# Patient Record
Sex: FEMALE | Race: WHITE | Employment: OTHER | URBAN - METROPOLITAN AREA
[De-identification: names, ages, dates, MRNs, and addresses within clinical notes are randomized per-mention and may not be internally consistent; named-entity substitution may affect disease eponyms.]

---

## 2019-04-26 ENCOUNTER — ANESTHESIA (OUTPATIENT)
Dept: ENDOSCOPY | Age: 69
End: 2019-04-26
Payer: MEDICARE

## 2019-04-26 ENCOUNTER — HOSPITAL ENCOUNTER (OUTPATIENT)
Age: 69
Setting detail: OUTPATIENT SURGERY
Discharge: HOME | End: 2019-04-26
Attending: INTERNAL MEDICINE | Admitting: INTERNAL MEDICINE
Payer: MEDICARE

## 2019-04-26 ENCOUNTER — ANESTHESIA EVENT (OUTPATIENT)
Dept: ENDOSCOPY | Age: 69
End: 2019-04-26
Payer: MEDICARE

## 2019-04-26 VITALS
SYSTOLIC BLOOD PRESSURE: 149 MMHG | RESPIRATION RATE: 22 BRPM | DIASTOLIC BLOOD PRESSURE: 80 MMHG | TEMPERATURE: 97.6 F | OXYGEN SATURATION: 98 % | WEIGHT: 130 LBS | HEART RATE: 57 BPM

## 2019-04-26 PROCEDURE — 77030003657 HC NDL SCLER BSC -B: Performed by: INTERNAL MEDICINE

## 2019-04-26 PROCEDURE — 77030027957 HC TBNG IRR ENDOGTR BUSS -B: Performed by: INTERNAL MEDICINE

## 2019-04-26 PROCEDURE — 76040000007: Performed by: INTERNAL MEDICINE

## 2019-04-26 PROCEDURE — 77030009426 HC FCPS BIOP ENDOSC BSC -B: Performed by: INTERNAL MEDICINE

## 2019-04-26 PROCEDURE — 76060000032 HC ANESTHESIA 0.5 TO 1 HR: Performed by: INTERNAL MEDICINE

## 2019-04-26 PROCEDURE — 74011250636 HC RX REV CODE- 250/636

## 2019-04-26 PROCEDURE — 88305 TISSUE EXAM BY PATHOLOGIST: CPT

## 2019-04-26 PROCEDURE — 77030013992 HC SNR POLYP ENDOSC BSC -B: Performed by: INTERNAL MEDICINE

## 2019-04-26 RX ORDER — FENTANYL CITRATE 50 UG/ML
100 INJECTION, SOLUTION INTRAMUSCULAR; INTRAVENOUS
Status: DISCONTINUED | OUTPATIENT
Start: 2019-04-26 | End: 2019-04-26 | Stop reason: HOSPADM

## 2019-04-26 RX ORDER — LISINOPRIL 10 MG/1
TABLET ORAL DAILY
COMMUNITY

## 2019-04-26 RX ORDER — EPINEPHRINE 0.1 MG/ML
1 INJECTION INTRACARDIAC; INTRAVENOUS
Status: DISCONTINUED | OUTPATIENT
Start: 2019-04-26 | End: 2019-04-26 | Stop reason: HOSPADM

## 2019-04-26 RX ORDER — DEXTROMETHORPHAN/PSEUDOEPHED 2.5-7.5/.8
1.2 DROPS ORAL
Status: DISCONTINUED | OUTPATIENT
Start: 2019-04-26 | End: 2019-04-26 | Stop reason: HOSPADM

## 2019-04-26 RX ORDER — SODIUM CHLORIDE 9 MG/ML
INJECTION, SOLUTION INTRAVENOUS
Status: DISCONTINUED | OUTPATIENT
Start: 2019-04-26 | End: 2019-04-26 | Stop reason: HOSPADM

## 2019-04-26 RX ORDER — SODIUM CHLORIDE 9 MG/ML
50 INJECTION, SOLUTION INTRAVENOUS CONTINUOUS
Status: DISCONTINUED | OUTPATIENT
Start: 2019-04-26 | End: 2019-04-26 | Stop reason: HOSPADM

## 2019-04-26 RX ORDER — PROPOFOL 10 MG/ML
INJECTION, EMULSION INTRAVENOUS AS NEEDED
Status: DISCONTINUED | OUTPATIENT
Start: 2019-04-26 | End: 2019-04-26 | Stop reason: HOSPADM

## 2019-04-26 RX ORDER — LIDOCAINE HYDROCHLORIDE 20 MG/ML
INJECTION, SOLUTION EPIDURAL; INFILTRATION; INTRACAUDAL; PERINEURAL AS NEEDED
Status: DISCONTINUED | OUTPATIENT
Start: 2019-04-26 | End: 2019-04-26 | Stop reason: HOSPADM

## 2019-04-26 RX ORDER — MIDAZOLAM HYDROCHLORIDE 1 MG/ML
.25-1 INJECTION, SOLUTION INTRAMUSCULAR; INTRAVENOUS
Status: DISCONTINUED | OUTPATIENT
Start: 2019-04-26 | End: 2019-04-26 | Stop reason: HOSPADM

## 2019-04-26 RX ORDER — ATROPINE SULFATE 0.1 MG/ML
0.5 INJECTION INTRAVENOUS
Status: DISCONTINUED | OUTPATIENT
Start: 2019-04-26 | End: 2019-04-26 | Stop reason: HOSPADM

## 2019-04-26 RX ORDER — ASPIRIN 325 MG
325 TABLET ORAL DAILY
COMMUNITY
End: 2019-08-06

## 2019-04-26 RX ORDER — NALOXONE HYDROCHLORIDE 0.4 MG/ML
0.4 INJECTION, SOLUTION INTRAMUSCULAR; INTRAVENOUS; SUBCUTANEOUS
Status: DISCONTINUED | OUTPATIENT
Start: 2019-04-26 | End: 2019-04-26 | Stop reason: HOSPADM

## 2019-04-26 RX ORDER — FLUMAZENIL 0.1 MG/ML
0.2 INJECTION INTRAVENOUS
Status: DISCONTINUED | OUTPATIENT
Start: 2019-04-26 | End: 2019-04-26 | Stop reason: HOSPADM

## 2019-04-26 RX ORDER — SODIUM CHLORIDE 0.9 % (FLUSH) 0.9 %
5-40 SYRINGE (ML) INJECTION EVERY 8 HOURS
Status: DISCONTINUED | OUTPATIENT
Start: 2019-04-26 | End: 2019-04-26 | Stop reason: HOSPADM

## 2019-04-26 RX ORDER — SODIUM CHLORIDE 0.9 % (FLUSH) 0.9 %
5-40 SYRINGE (ML) INJECTION AS NEEDED
Status: DISCONTINUED | OUTPATIENT
Start: 2019-04-26 | End: 2019-04-26 | Stop reason: HOSPADM

## 2019-04-26 RX ADMIN — PROPOFOL 25 MG: 10 INJECTION, EMULSION INTRAVENOUS at 16:27

## 2019-04-26 RX ADMIN — PROPOFOL 50 MG: 10 INJECTION, EMULSION INTRAVENOUS at 16:01

## 2019-04-26 RX ADMIN — PROPOFOL 50 MG: 10 INJECTION, EMULSION INTRAVENOUS at 16:14

## 2019-04-26 RX ADMIN — PROPOFOL 25 MG: 10 INJECTION, EMULSION INTRAVENOUS at 16:29

## 2019-04-26 RX ADMIN — PROPOFOL 50 MG: 10 INJECTION, EMULSION INTRAVENOUS at 16:07

## 2019-04-26 RX ADMIN — PROPOFOL 50 MG: 10 INJECTION, EMULSION INTRAVENOUS at 16:09

## 2019-04-26 RX ADMIN — PROPOFOL 25 MG: 10 INJECTION, EMULSION INTRAVENOUS at 16:18

## 2019-04-26 RX ADMIN — PROPOFOL 25 MG: 10 INJECTION, EMULSION INTRAVENOUS at 16:25

## 2019-04-26 RX ADMIN — PROPOFOL 25 MG: 10 INJECTION, EMULSION INTRAVENOUS at 16:16

## 2019-04-26 RX ADMIN — PROPOFOL 50 MG: 10 INJECTION, EMULSION INTRAVENOUS at 16:04

## 2019-04-26 RX ADMIN — SODIUM CHLORIDE: 9 INJECTION, SOLUTION INTRAVENOUS at 15:38

## 2019-04-26 RX ADMIN — PROPOFOL 25 MG: 10 INJECTION, EMULSION INTRAVENOUS at 16:43

## 2019-04-26 RX ADMIN — PROPOFOL 25 MG: 10 INJECTION, EMULSION INTRAVENOUS at 16:40

## 2019-04-26 RX ADMIN — PROPOFOL 25 MG: 10 INJECTION, EMULSION INTRAVENOUS at 16:20

## 2019-04-26 RX ADMIN — PROPOFOL 25 MG: 10 INJECTION, EMULSION INTRAVENOUS at 16:37

## 2019-04-26 RX ADMIN — PROPOFOL 25 MG: 10 INJECTION, EMULSION INTRAVENOUS at 16:31

## 2019-04-26 RX ADMIN — PROPOFOL 50 MG: 10 INJECTION, EMULSION INTRAVENOUS at 16:12

## 2019-04-26 RX ADMIN — PROPOFOL 50 MG: 10 INJECTION, EMULSION INTRAVENOUS at 15:59

## 2019-04-26 RX ADMIN — PROPOFOL 25 MG: 10 INJECTION, EMULSION INTRAVENOUS at 16:22

## 2019-04-26 RX ADMIN — LIDOCAINE HYDROCHLORIDE 50 MG: 20 INJECTION, SOLUTION EPIDURAL; INFILTRATION; INTRACAUDAL; PERINEURAL at 15:59

## 2019-04-26 RX ADMIN — SODIUM CHLORIDE: 9 INJECTION, SOLUTION INTRAVENOUS at 16:07

## 2019-04-26 RX ADMIN — PROPOFOL 25 MG: 10 INJECTION, EMULSION INTRAVENOUS at 16:33

## 2019-04-26 NOTE — DISCHARGE INSTRUCTIONS
[address]  [address] [address] [facility]                                  Amy Hwang  [id number]  1950    It was my pleasure seeing you for your procedure. You will also receive a summary report with the findings from this procedure and any further recommendations. If you had polyps removed or biopsies taken during your procedure, you will receive a separate letter from me within the next 2 weeks. If you don't receive this letter or if you have any questions, please call my office [phone]. Please take note of the post procedure instructions listed below. Jack Capellan,    Dr. Matt Alonso    These instructions give you information on caring for yourself after your procedure. Call your doctor if you have any problems or questions after your procedure. HOME CARE  · Walk if you have belly cramping or gas. Walking will help get rid of the air and reduce the bloated feeling in your belly (abdomen). · Your IV site (where you received drugs) may be tender to touch. Place warm towels on the site; keep your arm up on two pillows if you have any swelling or soreness in the area. · You may shower. ACTIVITY:  · Take frequent rest periods and move at a slower pace for the next 24 hours. .  · You may resume your regular activity tomorrow if you are feeling back to normal.  · Do not drive or ride a bicycle for at least 24 hours (because of the medicine (anesthesia) used during the test). · Do not sign any important legal documents or use or operate any machinery for 24 hours  · Do not take sleeping medicines/nerve drugs for 24 hours unless the doctor tells you. · You can return to work/school tomorrow unless otherwise instructed. NUTRITION:  · Drink plenty of fluids to keep your pee (urine) clear or pale yellow  · Begin with a light meal and progress to your normal diet.  Heavy or fried foods are harder to digest and may make you feel sick to your stomach (nauseated). · Once you are feeling back to normal, you may resume your normal diet as instructed by your doctor. · Avoid alcoholic beverages for 24 hours or as instructed. IF YOU HAD BIOPSIES TAKEN OR POLYPS REMOVED DURING THE PROCEDURE:  · For the next 7 days, avoid all non-steroidal antiinflammatory medications such as Ibuprofen, Motrin, Advil, Alleve, Vanessa-seltzer, Goody's powder, BC powder. · If you do not have an heart condition that requires you to take a daily aspirin, you should avoid taking aspirin for 7 days. · Eat a soft diet for 24 hours. · Monitor your stools for any blood or dark black, tar-like, stools as this may be a sign of bleeding and if you see any blood, notify your doctor immediately. GET HELP RIGHT AWAY AND SEEK IMMEDIATE MEDICAL CARE IF:  · You have more than a spotting of blood in your stool. · You pass clumps of tissue (blood clots) or fill the toilet with blood. · Your belly is painfully swollen or puffy (abdominal distention). · You throw up (vomit). · You have a fever. · You have redness, pain or swelling at the IV site that last greater than two days. · You have abdominal pain or discomfort that is severe or gets worse throughout the day. Post-procedure diagnosis:     DIVERTICULOSIS  COLON POLYPS    Rectum: normal  Sigmoid: severe diverticulosis, 15 mm pedunculated polyp 17 cm from the anal verge, removed with hot snare  Descending Colon: mild diverticulosis  Transverse Colon: normal  Ascending Colon: 15 mm flat polyp around hepatic flexure, lifted with 6 cc of eleview and removed with hot snare  Cecum: normal  Terminal Ileum: normal    Recommendations:   - Await pathology. You should receive a letter within 2 weeks. - Resume normal medications.  - Recommendations re: timing of repeat colonoscopy pending pathology.

## 2019-04-26 NOTE — H&P
[address]   [address] [facility] [address], [address]  [phone]                                History and Physical     NAME: Anastasiia Parker   :  1950   MRN:  [id number]     HPI:  The patient was seen and examined. Past Surgical History:   Procedure Laterality Date    HX GYN      HX OTHER SURGICAL       Past Medical History:   Diagnosis Date    Hypertension      Social History     Tobacco Use    Smoking status: Former Smoker    Smokeless tobacco: Never Used    Tobacco comment: 40 years ago in college   Substance Use Topics    Alcohol use: Yes     Comment: twice weekly    Drug use: Not on file     Allergies   Allergen Reactions    Latex Vertigo    Benadr [Diphenhydramine Hcl] Rash    Sulfa (Sulfonamide Antibiotics) Rash     History reviewed. No pertinent family history. No current facility-administered medications for this encounter. PHYSICAL EXAM:  General: WD, WN. Alert, cooperative, no acute distress    HEENT: NC, Atraumatic. PERRLA, EOMI. Anicteric sclerae. Lungs:  CTA Bilaterally. No Wheezing/Rhonchi/Rales. Heart:  Regular  rhythm,  No murmur, No Rubs, No Gallops  Abdomen: Soft, Non distended, Non tender.  +Bowel sounds, no HSM  Extremities: No c/c/e  Neurologic:  CN 2-12 gi, Alert and oriented X 3. No acute neurological distress   Psych:   Good insight. Not anxious nor agitated. The heart, lungs and mental status were satisfactory for the administration of MAC sedation and for the procedure.       Mallampati score: 2       Assessment:   · Diverticulitis, first colonoscopy    Plan:   · Endoscopic procedure :colonoscopy  · MAC sedation

## 2019-04-26 NOTE — PROGRESS NOTES
TRANSFER - IN REPORT:    Verbal report received from ARTIE(name) on Alissa Rash      Report consisted of patients Situation, Background, Assessment and   Recommendations(SBAR). Information from the following report(s) SBAR, Procedure Summary and MAR was reviewed with the receiving nurse. Opportunity for questions and clarification was provided. Assessment completed upon patients arrival to unit and care assumed.

## 2019-04-26 NOTE — ANESTHESIA PREPROCEDURE EVALUATION
Relevant Problems No relevant active problems Anesthetic History No history of anesthetic complications Review of Systems / Medical History Patient summary reviewed, nursing notes reviewed and pertinent labs reviewed Pulmonary Within defined limits Neuro/Psych Within defined limits Cardiovascular Within defined limits GI/Hepatic/Renal 
Within defined limits Endo/Other Within defined limits Other Findings Physical Exam 
 
Airway Mallampati: I 
TM Distance: > 6 cm Neck ROM: normal range of motion Mouth opening: Normal 
 
 Cardiovascular Regular rate and rhythm,  S1 and S2 normal,  no murmur, click, rub, or gallop Dental 
No notable dental hx Pulmonary Breath sounds clear to auscultation Abdominal 
GI exam deferred Other Findings Anesthetic Plan ASA: 2 Anesthesia type: MAC Anesthetic plan and risks discussed with: Patient

## 2019-04-26 NOTE — ANESTHESIA POSTPROCEDURE EVALUATION
Post-Anesthesia Evaluation and Assessment Patient: Luna Philip MRN: [id number]  SSN: [id number] YOB: 1950  Age: 80 y.o. Sex: female I have evaluated the patient and they are stable and ready for discharge from the PACU. Cardiovascular Function/Vital Signs Visit Vitals /88 Pulse 64 Resp 17 Wt 59 kg (130 lb) SpO2 97% Patient is status post MAC anesthesia for Procedure(s): 
COLONOSCOPY 
ENDOSCOPIC POLYPECTOMY INJECTION. Nausea/Vomiting: None Postoperative hydration reviewed and adequate. Pain: 
Pain Scale 1: Numeric (0 - 10) (04/26/19 1425) Pain Intensity 1: 0 (04/26/19 1425) Managed Neurological Status: At baseline Mental Status, Level of Consciousness: Alert and  oriented to person, place, and time Pulmonary Status:  
O2 Device: Nasal cannula (04/26/19 1645) Adequate oxygenation and airway patent Complications related to anesthesia: None Post-anesthesia assessment completed. No concerns Signed By: Elizabeth Tadeo MD   
 April 26, 2019 Procedure(s): 
COLONOSCOPY 
ENDOSCOPIC POLYPECTOMY INJECTION. MAC 
 
<BSHSIANPOST> Vitals Value Taken Time /88 4/26/2019  4:45 PM  
Temp Pulse 64 4/26/2019  4:45 PM  
Resp 17 4/26/2019  4:45 PM  
SpO2 97 % 4/26/2019  4:45 PM

## 2019-04-26 NOTE — PROCEDURES
[address] [facility]  [address] [address] [address], [address]  [phone]                              Colonoscopy Procedure Note      Indications:  Prior diverticulitis, first colonsocopy     :  Tamiko Morataya MD    Referring Provider: Skylar Nieves MD    Sedation:  MAC anesthesia    Procedure Details:  After informed consent was obtained with all risks and benefits of procedure explained and preoperative exam completed, the patient was taken to the endoscopy suite and placed in the left lateral decubitus position. Upon sequential sedation as per above, a digital rectal exam was performed per below. The Olympus videocolonoscope was inserted in the rectum and carefully advanced to the cecum, which was identified by the ileocecal valve and appendiceal orifice, terminal ileum. The quality of preparation was good. [place] Bowel Prep Score :3/3/3. The colonoscope was slowly withdrawn with careful evaluation between folds. Retroflexion in the rectum was performed. Findings:   Rectum: normal  Sigmoid: severe diverticulosis, 15 mm pedunculated polyp 17 cm from the anal verge, removed with hot snare  Descending Colon: mild diverticulosis  Transverse Colon: normal  Ascending Colon: 15 mm flat polyp around hepatic flexure, lifted with 6 cc of eleview and removed with hot snare  Cecum: normal  Terminal Ileum: normal    Interventions:  EMR    Specimen Removed:    ID Type Source Tests Collected by Time Destination   1 : AT 25 CM Preservative Sigmoid  Linda Waters MD 4/26/2019 1608 Pathology   2 : PATHOLOGY Preservative Colon, Ascending  Linda Waters MD 4/26/2019 1623 Pathology       Complications: None. EBL:  Minimal    Impression:    See Postoperative diagnosis above    Recommendations:   - Await pathology. You should receive a letter within 2 weeks. - Resume normal medications.  - Recommendations re: repeat colonoscopy timing pending pathology.      Discharge Disposition:  Home in the company of a  when able to ambulate.     Ly Renee MD  4/26/2019  4:47 PM

## 2019-05-16 ENCOUNTER — HOSPITAL ENCOUNTER (OUTPATIENT)
Dept: CT IMAGING | Age: 69
Discharge: HOME | End: 2019-05-16
Attending: INTERNAL MEDICINE
Payer: MEDICARE

## 2019-05-16 DIAGNOSIS — K57.92 DIVERTICULITIS: ICD-10-CM

## 2019-05-16 DIAGNOSIS — K76.89 LIVER CYST: ICD-10-CM

## 2019-05-16 DIAGNOSIS — C18.7 ADENOCARCINOMA OF SIGMOID COLON (HCC): ICD-10-CM

## 2019-05-16 PROCEDURE — 74011000258 HC RX REV CODE- 258: Performed by: RADIOLOGY

## 2019-05-16 PROCEDURE — 74011636320 HC RX REV CODE- 636/320: Performed by: RADIOLOGY

## 2019-05-16 PROCEDURE — 82565 ASSAY OF CREATININE: CPT

## 2019-05-16 PROCEDURE — 74177 CT ABD & PELVIS W/CONTRAST: CPT

## 2019-05-16 RX ORDER — SODIUM CHLORIDE 0.9 % (FLUSH) 0.9 %
10 SYRINGE (ML) INJECTION
Status: COMPLETED | OUTPATIENT
Start: 2019-05-16 | End: 2019-05-16

## 2019-05-16 RX ADMIN — IOPAMIDOL 100 ML: 755 INJECTION, SOLUTION INTRAVENOUS at 10:58

## 2019-05-16 RX ADMIN — Medication 10 ML: at 10:58

## 2019-05-16 RX ADMIN — IOHEXOL 50 ML: 240 INJECTION, SOLUTION INTRATHECAL; INTRAVASCULAR; INTRAVENOUS; ORAL at 10:58

## 2019-05-16 RX ADMIN — SODIUM CHLORIDE 100 ML: 900 INJECTION, SOLUTION INTRAVENOUS at 10:58

## 2019-05-17 LAB — CREAT BLD-MCNC: 0.5 MG/DL (ref 0.6–1.3)

## 2019-07-15 ENCOUNTER — HOSPITAL ENCOUNTER (OUTPATIENT)
Dept: CT IMAGING | Age: 69
Discharge: HOME | End: 2019-07-15
Attending: INTERNAL MEDICINE
Payer: MEDICARE

## 2019-07-15 DIAGNOSIS — C18.7 ADENOCARCINOMA OF SIGMOID COLON (HCC): ICD-10-CM

## 2019-07-15 DIAGNOSIS — K57.92 DIVERTICULITIS: ICD-10-CM

## 2019-07-15 PROCEDURE — 74177 CT ABD & PELVIS W/CONTRAST: CPT

## 2019-07-15 PROCEDURE — 74011000258 HC RX REV CODE- 258: Performed by: RADIOLOGY

## 2019-07-15 PROCEDURE — 74011636320 HC RX REV CODE- 636/320: Performed by: RADIOLOGY

## 2019-07-15 RX ORDER — SODIUM CHLORIDE 0.9 % (FLUSH) 0.9 %
10 SYRINGE (ML) INJECTION
Status: COMPLETED | OUTPATIENT
Start: 2019-07-15 | End: 2019-07-15

## 2019-07-15 RX ADMIN — Medication 10 ML: at 13:47

## 2019-07-15 RX ADMIN — SODIUM CHLORIDE 100 ML: 900 INJECTION, SOLUTION INTRAVENOUS at 13:47

## 2019-07-15 RX ADMIN — IOPAMIDOL 100 ML: 755 INJECTION, SOLUTION INTRAVENOUS at 13:47

## 2019-08-05 ENCOUNTER — ANESTHESIA (OUTPATIENT)
Dept: ENDOSCOPY | Age: 69
End: 2019-08-05
Payer: MEDICARE

## 2019-08-05 ENCOUNTER — HOSPITAL ENCOUNTER (OUTPATIENT)
Age: 69
Setting detail: OUTPATIENT SURGERY
Discharge: HOME | End: 2019-08-05
Attending: INTERNAL MEDICINE | Admitting: INTERNAL MEDICINE
Payer: MEDICARE

## 2019-08-05 ENCOUNTER — ANESTHESIA EVENT (OUTPATIENT)
Dept: ENDOSCOPY | Age: 69
End: 2019-08-05
Payer: MEDICARE

## 2019-08-05 VITALS
HEIGHT: 65 IN | SYSTOLIC BLOOD PRESSURE: 155 MMHG | RESPIRATION RATE: 16 BRPM | TEMPERATURE: 98.2 F | WEIGHT: 130 LBS | HEART RATE: 64 BPM | DIASTOLIC BLOOD PRESSURE: 97 MMHG | BODY MASS INDEX: 21.66 KG/M2 | OXYGEN SATURATION: 97 %

## 2019-08-05 PROCEDURE — 77030040684 HC NDL ENDOSC NEEDLEMASTR OCOA -B: Performed by: INTERNAL MEDICINE

## 2019-08-05 PROCEDURE — 88305 TISSUE EXAM BY PATHOLOGIST: CPT

## 2019-08-05 PROCEDURE — 74011250636 HC RX REV CODE- 250/636: Performed by: NURSE ANESTHETIST, CERTIFIED REGISTERED

## 2019-08-05 PROCEDURE — 76040000007: Performed by: INTERNAL MEDICINE

## 2019-08-05 PROCEDURE — 77030013992 HC SNR POLYP ENDOSC BSC -B: Performed by: INTERNAL MEDICINE

## 2019-08-05 PROCEDURE — 76060000032 HC ANESTHESIA 0.5 TO 1 HR: Performed by: INTERNAL MEDICINE

## 2019-08-05 PROCEDURE — 74011250636 HC RX REV CODE- 250/636: Performed by: INTERNAL MEDICINE

## 2019-08-05 RX ORDER — MIDAZOLAM HYDROCHLORIDE 1 MG/ML
.25-1 INJECTION, SOLUTION INTRAMUSCULAR; INTRAVENOUS
Status: DISCONTINUED | OUTPATIENT
Start: 2019-08-05 | End: 2019-08-05 | Stop reason: HOSPADM

## 2019-08-05 RX ORDER — EPINEPHRINE 0.1 MG/ML
1 INJECTION INTRACARDIAC; INTRAVENOUS
Status: DISCONTINUED | OUTPATIENT
Start: 2019-08-05 | End: 2019-08-05 | Stop reason: HOSPADM

## 2019-08-05 RX ORDER — SODIUM CHLORIDE 0.9 % (FLUSH) 0.9 %
5-40 SYRINGE (ML) INJECTION EVERY 8 HOURS
Status: DISCONTINUED | OUTPATIENT
Start: 2019-08-05 | End: 2019-08-05 | Stop reason: HOSPADM

## 2019-08-05 RX ORDER — SODIUM CHLORIDE 9 MG/ML
50 INJECTION, SOLUTION INTRAVENOUS CONTINUOUS
Status: DISCONTINUED | OUTPATIENT
Start: 2019-08-05 | End: 2019-08-05 | Stop reason: HOSPADM

## 2019-08-05 RX ORDER — SODIUM CHLORIDE 0.9 % (FLUSH) 0.9 %
5-40 SYRINGE (ML) INJECTION AS NEEDED
Status: DISCONTINUED | OUTPATIENT
Start: 2019-08-05 | End: 2019-08-05 | Stop reason: HOSPADM

## 2019-08-05 RX ORDER — ATROPINE SULFATE 0.1 MG/ML
0.5 INJECTION INTRAVENOUS
Status: DISCONTINUED | OUTPATIENT
Start: 2019-08-05 | End: 2019-08-05 | Stop reason: HOSPADM

## 2019-08-05 RX ORDER — NALOXONE HYDROCHLORIDE 0.4 MG/ML
0.4 INJECTION, SOLUTION INTRAMUSCULAR; INTRAVENOUS; SUBCUTANEOUS
Status: DISCONTINUED | OUTPATIENT
Start: 2019-08-05 | End: 2019-08-05 | Stop reason: HOSPADM

## 2019-08-05 RX ORDER — PROPOFOL 10 MG/ML
INJECTION, EMULSION INTRAVENOUS AS NEEDED
Status: DISCONTINUED | OUTPATIENT
Start: 2019-08-05 | End: 2019-08-05 | Stop reason: HOSPADM

## 2019-08-05 RX ORDER — FENTANYL CITRATE 50 UG/ML
100 INJECTION, SOLUTION INTRAMUSCULAR; INTRAVENOUS
Status: DISCONTINUED | OUTPATIENT
Start: 2019-08-05 | End: 2019-08-05 | Stop reason: HOSPADM

## 2019-08-05 RX ORDER — DEXTROMETHORPHAN/PSEUDOEPHED 2.5-7.5/.8
1.2 DROPS ORAL
Status: DISCONTINUED | OUTPATIENT
Start: 2019-08-05 | End: 2019-08-05 | Stop reason: HOSPADM

## 2019-08-05 RX ORDER — SODIUM CHLORIDE 9 MG/ML
INJECTION, SOLUTION INTRAVENOUS
Status: DISCONTINUED | OUTPATIENT
Start: 2019-08-05 | End: 2019-08-05 | Stop reason: HOSPADM

## 2019-08-05 RX ORDER — FLUMAZENIL 0.1 MG/ML
0.2 INJECTION INTRAVENOUS
Status: DISCONTINUED | OUTPATIENT
Start: 2019-08-05 | End: 2019-08-05 | Stop reason: HOSPADM

## 2019-08-05 RX ORDER — LIDOCAINE HYDROCHLORIDE 20 MG/ML
INJECTION, SOLUTION EPIDURAL; INFILTRATION; INTRACAUDAL; PERINEURAL AS NEEDED
Status: DISCONTINUED | OUTPATIENT
Start: 2019-08-05 | End: 2019-08-05 | Stop reason: HOSPADM

## 2019-08-05 RX ADMIN — LIDOCAINE HYDROCHLORIDE 40 MG: 20 INJECTION, SOLUTION EPIDURAL; INFILTRATION; INTRACAUDAL; PERINEURAL at 08:49

## 2019-08-05 RX ADMIN — PROPOFOL 50 MG: 10 INJECTION, EMULSION INTRAVENOUS at 08:50

## 2019-08-05 RX ADMIN — PROPOFOL 20 MG: 10 INJECTION, EMULSION INTRAVENOUS at 09:04

## 2019-08-05 RX ADMIN — PROPOFOL 30 MG: 10 INJECTION, EMULSION INTRAVENOUS at 08:56

## 2019-08-05 RX ADMIN — PROPOFOL 20 MG: 10 INJECTION, EMULSION INTRAVENOUS at 09:11

## 2019-08-05 RX ADMIN — PROPOFOL 50 MG: 10 INJECTION, EMULSION INTRAVENOUS at 08:49

## 2019-08-05 RX ADMIN — SODIUM CHLORIDE: 900 INJECTION, SOLUTION INTRAVENOUS at 08:41

## 2019-08-05 RX ADMIN — PROPOFOL 40 MG: 10 INJECTION, EMULSION INTRAVENOUS at 09:14

## 2019-08-05 RX ADMIN — PROPOFOL 20 MG: 10 INJECTION, EMULSION INTRAVENOUS at 09:08

## 2019-08-05 RX ADMIN — PROPOFOL 50 MG: 10 INJECTION, EMULSION INTRAVENOUS at 08:53

## 2019-08-05 RX ADMIN — PROPOFOL 50 MG: 10 INJECTION, EMULSION INTRAVENOUS at 09:21

## 2019-08-05 RX ADMIN — PROPOFOL 20 MG: 10 INJECTION, EMULSION INTRAVENOUS at 09:01

## 2019-08-05 NOTE — PROCEDURES
[address] [facility].  217 [facility] [address], [address]  [phone]                              Colonoscopy Procedure Note      Indications: For follow-up of hepatic flexure polypectomy and T1 sigmoid adenocarcinoma (involving head of pedunculated polyp with clear borders) April 2019    :  Nadine Peirre MD    Referring Provider: Celestino Enriquez MD    Sedation:  MAC anesthesia Propofol and MAC anesthesia    Procedure Details:  After informed consent was obtained with all risks and benefits of procedure explained and preoperative exam completed, the patient was taken to the endoscopy suite and placed in the left lateral decubitus position. Upon sequential sedation as per above, a digital rectal exam was performed per below. The Olympus videocolonoscope was inserted in the rectum and carefully advanced to the cecum, which was identified by the ileocecal valve and appendiceal orifice. The quality of preparation was good. [place] Bowel Prep Score : 3/3/3. The colonoscope was slowly withdrawn with careful evaluation between folds. Retroflexion in the rectum was performed. Findings:   Rectum: normal  Sigmoid: severe diverticulosis without inflammatory changes. Extensively examined this area without evidence of polyp or polypectomy site, empirically tattooed at 17 cm from the anal verge  Descending Colon: normal  Transverse Colon: one 2 mm sessile polyp, removed with cold snare  Ascending Colon: normal, no evidence of residual polyp  Cecum: normal    Interventions:  polypectomy    Specimen Removed:    ID Type Source Tests Collected by Time Destination   1 : transverse colon polyp Preservative Colon, Transverse  Cait Turner MD 8/5/2019 6106 Pathology       Complications: None. EBL:  Minimal    Impression:    See Postoperative diagnosis above    Recommendations:   - Await pathology. You should receive a letter within 2 weeks. - Resume normal medications.   - Plan for sigmoid resection in light of recurrent diverticulitis and prior T1 adenocarcinoma in head of pedunculated polyp.   - Recommend repeat colonoscopy in 1 year. Discharge Disposition:  Home in the company of a  when able to ambulate.     Maribel Smith MD  8/5/2019  9:27 AM

## 2019-08-05 NOTE — PERIOP NOTES

## 2019-08-05 NOTE — DISCHARGE INSTRUCTIONS
[address] [facility] [address]  217 [facility] [address] [facility]                                  Katarzyna De La Rosa  [id number]  1950    It was my pleasure seeing you for your procedure. You will also receive a summary report with the findings from this procedure and any further recommendations. If you had polyps removed or biopsies taken during your procedure, you will receive a separate letter from me within the next 2 weeks. If you don't receive this letter or if you have any questions, please call my office [phone]. Please take note of the post procedure instructions listed below. Dr. Sherine Hernandez    These instructions give you information on caring for yourself after your procedure. Call your doctor if you have any problems or questions after your procedure. HOME CARE  · Walk if you have belly cramping or gas. Walking will help get rid of the air and reduce the bloated feeling in your belly (abdomen). · Your IV site (where you received drugs) may be tender to touch. Place warm towels on the site; keep your arm up on two pillows if you have any swelling or soreness in the area. · You may shower. ACTIVITY:  · Take frequent rest periods and move at a slower pace for the next 24 hours. .  · You may resume your regular activity tomorrow if you are feeling back to normal.  · Do not drive or ride a bicycle for at least 24 hours (because of the medicine (anesthesia) used during the test). · Do not sign any important legal documents or use or operate any machinery for 24 hours  · Do not take sleeping medicines/nerve drugs for 24 hours unless the doctor tells you. · You can return to work/school tomorrow unless otherwise instructed. NUTRITION:  · Drink plenty of fluids to keep your pee (urine) clear or pale yellow  · Begin with a light meal and progress to your normal diet.  Heavy or fried foods are harder to digest and may make you feel sick to your stomach (nauseated). · Once you are feeling back to normal, you may resume your normal diet as instructed by your doctor. · Avoid alcoholic beverages for 24 hours or as instructed. IF YOU HAD BIOPSIES TAKEN OR POLYPS REMOVED DURING THE PROCEDURE:  · For the next 7 days, avoid all non-steroidal antiinflammatory medications such as Ibuprofen, Motrin, Advil, Alleve, Vanessa-seltzer, Goody's powder, BC powder. · If you do not have an heart condition that requires you to take a daily aspirin, you should avoid taking aspirin for 7 days. · Eat a soft diet for 24 hours. · Monitor your stools for any blood or dark black, tar-like, stools as this may be a sign of bleeding and if you see any blood, notify your doctor immediately. GET HELP RIGHT AWAY AND SEEK IMMEDIATE MEDICAL CARE IF:  · You have more than a spotting of blood in your stool. · You pass clumps of tissue (blood clots) or fill the toilet with blood. · Your belly is painfully swollen or puffy (abdominal distention). · You throw up (vomit). · You have a fever. · You have redness, pain or swelling at the IV site that last greater than two days. · You have abdominal pain or discomfort that is severe or gets worse throughout the day. Post-procedure diagnosis:   1. diverticulosis  2.transverse colon polyp    Findings:   Rectum: normal  Sigmoid: severe diverticulosis without inflammatory changes. Extensively examined this area without evidence of polyp or polypectomy site, empirically tattooed at 17 cm from the anal verge  Descending Colon: normal  Transverse Colon: one 2 mm sessile polyp, removed with cold snare  Ascending Colon: normal, no evidence of residual polyp  Cecum: normal    Interventions:  polypectomy    Recommendations:   - Await pathology. You should receive a letter within 2 weeks. - Resume normal medications.   - Plan for sigmoid resection in light of recurrent diverticulitis and prior T1 adenocarcinoma in head of pedunculated polyp.   - Recommend repeat colonoscopy in 1 year.

## 2019-08-05 NOTE — ANESTHESIA PREPROCEDURE EVALUATION
Relevant Problems   No relevant active problems       Anesthetic History   No history of anesthetic complications            Review of Systems / Medical History  Patient summary reviewed, nursing notes reviewed and pertinent labs reviewed    Pulmonary  Within defined limits                 Neuro/Psych   Within defined limits           Cardiovascular  Within defined limits  Hypertension                   GI/Hepatic/Renal  Within defined limits              Endo/Other  Within defined limits           Other Findings              Physical Exam    Airway  Mallampati: II  TM Distance: > 6 cm  Neck ROM: normal range of motion   Mouth opening: Normal     Cardiovascular  Regular rate and rhythm,  S1 and S2 normal,  no murmur, click, rub, or gallop             Dental  No notable dental hx       Pulmonary  Breath sounds clear to auscultation               Abdominal  GI exam deferred       Other Findings            Anesthetic Plan    ASA: 2  Anesthesia type: MAC            Anesthetic plan and risks discussed with: Patient

## 2019-08-05 NOTE — ANESTHESIA POSTPROCEDURE EVALUATION
Procedure(s):  COLONOSCOPY (LATEX ALLERGY)  INJECTION  ENDOSCOPIC POLYPECTOMY. MAC    <BSHSIANPOST>    Vitals Value Taken Time   /85 8/5/2019  9:47 AM   Temp 36.8 °C (98.2 °F) 8/5/2019  9:30 AM   Pulse 65 8/5/2019  9:49 AM   Resp 16 8/5/2019  9:49 AM   SpO2 97 % 8/5/2019  9:49 AM   Vitals shown include unvalidated device data.

## 2019-08-05 NOTE — ROUTINE PROCESS
Chai Screen  1950  [id number]    Situation:  Verbal report received from: [facility]  Procedure: Procedure(s):  COLONOSCOPY (LATEX ALLERGY)  INJECTION  ENDOSCOPIC POLYPECTOMY    Background:    Preoperative diagnosis: ADENOCARCINOMA OF SIGMOID COLON, DIVERTICULITIS  Postoperative diagnosis: 1. diverticulosis  2.transverse colon polyp    :  Dr. Jacklyn Lanier  Assistant(s): Endoscopy Technician-1: Ronda Levine  Endoscopy RN-1: Toña Greer    Specimens:   ID Type Source Tests Collected by Time Destination   1 : transverse colon polyp Preservative Colon, Transverse  Paula Grant MD 8/5/2019 3997 Pathology     H. Pylori  no    Assessment:  Intra-procedure medications     Anesthesia gave intra-procedure sedation and medications, see anesthesia flow sheet yes    Intravenous fluids: NS@ KVO     Vital signs stable     Abdominal assessment: round and soft     Recommendation:  Discharge patient per MD order.     Family or Friend   Permission to share finding with family or friend yes

## 2019-08-05 NOTE — H&P
[address] [facility] [address]  [address], [address]  [phone]                                History and Physical     NAME: Chai Garduno   :  1950   MRN:  [id number]     HPI:  The patient was seen and examined. Past Surgical History:   Procedure Laterality Date    COLONOSCOPY N/A 2019    COLONOSCOPY performed by Paula Grant MD at [facility] ENDOSCOPY    HX GYN      hysterectomy    HX OTHER SURGICAL       Past Medical History:   Diagnosis Date    Hypertension      Social History     Tobacco Use    Smoking status: Former Smoker    Smokeless tobacco: Never Used    Tobacco comment: 40 years ago in college   Substance Use Topics    Alcohol use: Yes     Comment: twice weekly    Drug use: Not on file     Allergies   Allergen Reactions    Latex Vertigo    Benadr [Diphenhydramine Hcl] Rash    Sulfa (Sulfonamide Antibiotics) Rash     Family History   Problem Relation Age of Onset    Alzheimer Mother      Current Facility-Administered Medications   Medication Dose Route Frequency    0.9% sodium chloride infusion  50 mL/hr IntraVENous CONTINUOUS    sodium chloride (NS) flush 5-40 mL  5-40 mL IntraVENous Q8H    sodium chloride (NS) flush 5-40 mL  5-40 mL IntraVENous PRN    midazolam (VERSED) injection 0.25-10 mg  0.25-10 mg IntraVENous Multiple    fentaNYL citrate (PF) injection 100 mcg  100 mcg IntraVENous MULTIPLE DOSE GIVEN    naloxone (NARCAN) injection 0.4 mg  0.4 mg IntraVENous Multiple    flumazenil (ROMAZICON) 0.1 mg/mL injection 0.2 mg  0.2 mg IntraVENous Multiple    simethicone (MYLICON) 62LT/9.2TS oral drops 80 mg  1.2 mL Oral Multiple    atropine injection 0.5 mg  0.5 mg IntraVENous ONCE PRN    EPINEPHrine (ADRENALIN) 0.1 mg/mL syringe 1 mg  1 mg Endoscopically ONCE PRN         PHYSICAL EXAM:  General: WD, WN. Alert, cooperative, no acute distress    HEENT: NC, Atraumatic. PERRLA, EOMI. Anicteric sclerae. Lungs:  CTA Bilaterally.  No Wheezing/Rhonchi/Rales. Heart:  Regular  rhythm,  No murmur, No Rubs, No Gallops  Abdomen: Soft, Non distended, Non tender.  +Bowel sounds, no HSM  Extremities: No c/c/e  Neurologic:  CN 2-12 gi, Alert and oriented X 3. No acute neurological distress   Psych:   Good insight. Not anxious nor agitated. The heart, lungs and mental status were satisfactory for the administration of MAC sedation and for the procedure.       Mallampati score: 2       Assessment:   · Follow up of recent polypectomy, planned surgery 8/15/19    Plan:   · Endoscopic procedure :colonoscopy  · MAC sedation

## 2019-08-06 ENCOUNTER — HOSPITAL ENCOUNTER (OUTPATIENT)
Dept: GENERAL RADIOLOGY | Age: 69
Discharge: HOME | End: 2019-08-06
Attending: COLON & RECTAL SURGERY
Payer: MEDICARE

## 2019-08-06 ENCOUNTER — HOSPITAL ENCOUNTER (OUTPATIENT)
Dept: PREADMISSION TESTING | Age: 69
Discharge: HOME | End: 2019-08-06
Payer: MEDICARE

## 2019-08-06 VITALS
BODY MASS INDEX: 21.83 KG/M2 | TEMPERATURE: 97.9 F | HEIGHT: 65 IN | SYSTOLIC BLOOD PRESSURE: 161 MMHG | WEIGHT: 131 LBS | DIASTOLIC BLOOD PRESSURE: 84 MMHG | HEART RATE: 60 BPM

## 2019-08-06 LAB
ALBUMIN SERPL-MCNC: 4.2 G/DL (ref 3.5–5)
ALBUMIN/GLOB SERPL: 1.4 {RATIO} (ref 1.1–2.2)
ALP SERPL-CCNC: 79 U/L (ref 45–117)
ALT SERPL-CCNC: 28 U/L (ref 12–78)
ANION GAP SERPL CALC-SCNC: 5 MMOL/L (ref 5–15)
APTT PPP: 27.2 SEC (ref 22.1–32)
AST SERPL-CCNC: 14 U/L (ref 15–37)
ATRIAL RATE: 54 BPM
BILIRUB SERPL-MCNC: 0.7 MG/DL (ref 0.2–1)
BUN SERPL-MCNC: 15 MG/DL (ref 6–20)
BUN/CREAT SERPL: 28 (ref 12–20)
CALCIUM SERPL-MCNC: 9.9 MG/DL (ref 8.5–10.1)
CALCULATED P AXIS, ECG09: 23 DEGREES
CALCULATED R AXIS, ECG10: -32 DEGREES
CALCULATED T AXIS, ECG11: -2 DEGREES
CEA SERPL-MCNC: 1.8 NG/ML
CHLORIDE SERPL-SCNC: 108 MMOL/L (ref 97–108)
CO2 SERPL-SCNC: 29 MMOL/L (ref 21–32)
CREAT SERPL-MCNC: 0.53 MG/DL (ref 0.55–1.02)
DIAGNOSIS, 93000: NORMAL
ERYTHROCYTE [DISTWIDTH] IN BLOOD BY AUTOMATED COUNT: 14.1 % (ref 11.5–14.5)
EST. AVERAGE GLUCOSE BLD GHB EST-MCNC: 97 MG/DL
GLOBULIN SER CALC-MCNC: 2.9 G/DL (ref 2–4)
GLUCOSE SERPL-MCNC: 93 MG/DL (ref 65–100)
HBA1C MFR BLD: 5 % (ref 4.2–6.3)
HCT VFR BLD AUTO: 42.4 % (ref 35–47)
HGB BLD-MCNC: 13.9 G/DL (ref 11.5–16)
INR PPP: 1 (ref 0.9–1.1)
MAGNESIUM SERPL-MCNC: 2.2 MG/DL (ref 1.6–2.4)
MCH RBC QN AUTO: 28.8 PG (ref 26–34)
MCHC RBC AUTO-ENTMCNC: 32.8 G/DL (ref 30–36.5)
MCV RBC AUTO: 88 FL (ref 80–99)
NRBC # BLD: 0 K/UL (ref 0–0.01)
NRBC BLD-RTO: 0 PER 100 WBC
P-R INTERVAL, ECG05: 158 MS
PHOSPHATE SERPL-MCNC: 2.8 MG/DL (ref 2.6–4.7)
PLATELET # BLD AUTO: 217 K/UL (ref 150–400)
PMV BLD AUTO: 11.1 FL (ref 8.9–12.9)
POTASSIUM SERPL-SCNC: 4.2 MMOL/L (ref 3.5–5.1)
PROT SERPL-MCNC: 7.1 G/DL (ref 6.4–8.2)
PROTHROMBIN TIME: 10.4 SEC (ref 9–11.1)
Q-T INTERVAL, ECG07: 460 MS
QRS DURATION, ECG06: 130 MS
QTC CALCULATION (BEZET), ECG08: 436 MS
RBC # BLD AUTO: 4.82 M/UL (ref 3.8–5.2)
SODIUM SERPL-SCNC: 142 MMOL/L (ref 136–145)
THERAPEUTIC RANGE,PTTT: NORMAL SECS (ref 58–77)
VENTRICULAR RATE, ECG03: 54 BPM
WBC # BLD AUTO: 3.8 K/UL (ref 3.6–11)

## 2019-08-06 PROCEDURE — 85610 PROTHROMBIN TIME: CPT

## 2019-08-06 PROCEDURE — 82378 CARCINOEMBRYONIC ANTIGEN: CPT

## 2019-08-06 PROCEDURE — 83735 ASSAY OF MAGNESIUM: CPT

## 2019-08-06 PROCEDURE — 80053 COMPREHEN METABOLIC PANEL: CPT

## 2019-08-06 PROCEDURE — 84100 ASSAY OF PHOSPHORUS: CPT

## 2019-08-06 PROCEDURE — 85730 THROMBOPLASTIN TIME PARTIAL: CPT

## 2019-08-06 PROCEDURE — 85027 COMPLETE CBC AUTOMATED: CPT

## 2019-08-06 PROCEDURE — 86900 BLOOD TYPING SEROLOGIC ABO: CPT

## 2019-08-06 PROCEDURE — 83036 HEMOGLOBIN GLYCOSYLATED A1C: CPT

## 2019-08-06 PROCEDURE — 93005 ELECTROCARDIOGRAM TRACING: CPT

## 2019-08-06 PROCEDURE — 71046 X-RAY EXAM CHEST 2 VIEWS: CPT

## 2019-08-07 PROBLEM — I45.10 RBBB: Status: ACTIVE | Noted: 2019-08-07

## 2019-08-07 LAB
ABO + RH BLD: NORMAL
BLOOD GROUP ANTIBODIES SERPL: NORMAL
SPECIMEN EXP DATE BLD: NORMAL

## 2019-08-15 ENCOUNTER — ANESTHESIA EVENT (OUTPATIENT)
Dept: SURGERY | Age: 69
DRG: 330 | End: 2019-08-15
Payer: MEDICARE

## 2019-08-15 ENCOUNTER — ANESTHESIA (OUTPATIENT)
Dept: SURGERY | Age: 69
DRG: 330 | End: 2019-08-15
Payer: MEDICARE

## 2019-08-15 ENCOUNTER — HOSPITAL ENCOUNTER (INPATIENT)
Age: 69
LOS: 4 days | Discharge: HOME | DRG: 330 | End: 2019-08-19
Attending: COLON & RECTAL SURGERY | Admitting: COLON & RECTAL SURGERY
Payer: MEDICARE

## 2019-08-15 DIAGNOSIS — G89.18 ACUTE POST-OPERATIVE PAIN: Primary | ICD-10-CM

## 2019-08-15 PROBLEM — C18.9 COLON CANCER (HCC): Status: ACTIVE | Noted: 2019-08-15

## 2019-08-15 PROBLEM — Z87.19 HISTORY OF DIVERTICULITIS OF COLON: Chronic | Status: ACTIVE | Noted: 2019-08-15

## 2019-08-15 PROBLEM — I10 HYPERTENSION: Chronic | Status: ACTIVE | Noted: 2019-08-15

## 2019-08-15 LAB
ANION GAP SERPL CALC-SCNC: 10 MMOL/L (ref 5–15)
BASOPHILS # BLD: 0 K/UL (ref 0–0.1)
BASOPHILS NFR BLD: 0 % (ref 0–1)
BUN SERPL-MCNC: 9 MG/DL (ref 6–20)
BUN/CREAT SERPL: 10 (ref 12–20)
CALCIUM SERPL-MCNC: 8.3 MG/DL (ref 8.5–10.1)
CHLORIDE SERPL-SCNC: 108 MMOL/L (ref 97–108)
CO2 SERPL-SCNC: 23 MMOL/L (ref 21–32)
CREAT SERPL-MCNC: 0.93 MG/DL (ref 0.55–1.02)
DIFFERENTIAL METHOD BLD: ABNORMAL
EOSINOPHIL # BLD: 0 K/UL (ref 0–0.4)
EOSINOPHIL NFR BLD: 0 % (ref 0–7)
ERYTHROCYTE [DISTWIDTH] IN BLOOD BY AUTOMATED COUNT: 14.1 % (ref 11.5–14.5)
GLUCOSE SERPL-MCNC: 155 MG/DL (ref 65–100)
HCT VFR BLD AUTO: 38.2 % (ref 35–47)
HGB BLD-MCNC: 12.3 G/DL (ref 11.5–16)
IMM GRANULOCYTES # BLD AUTO: 0 K/UL
IMM GRANULOCYTES NFR BLD AUTO: 0 %
LYMPHOCYTES # BLD: 0.6 K/UL (ref 0.8–3.5)
LYMPHOCYTES NFR BLD: 7 % (ref 12–49)
MAGNESIUM SERPL-MCNC: 2.4 MG/DL (ref 1.6–2.4)
MCH RBC QN AUTO: 28.4 PG (ref 26–34)
MCHC RBC AUTO-ENTMCNC: 32.2 G/DL (ref 30–36.5)
MCV RBC AUTO: 88.2 FL (ref 80–99)
MONOCYTES # BLD: 0.3 K/UL (ref 0–1)
MONOCYTES NFR BLD: 4 % (ref 5–13)
NEUTS BAND NFR BLD MANUAL: 7 % (ref 0–6)
NEUTS SEG # BLD: 7.5 K/UL (ref 1.8–8)
NEUTS SEG NFR BLD: 82 % (ref 32–75)
NRBC # BLD: 0 K/UL (ref 0–0.01)
NRBC BLD-RTO: 0 PER 100 WBC
PHOSPHATE SERPL-MCNC: 2.9 MG/DL (ref 2.6–4.7)
PLATELET # BLD AUTO: 209 K/UL (ref 150–400)
PMV BLD AUTO: 10.8 FL (ref 8.9–12.9)
POTASSIUM SERPL-SCNC: 3.6 MMOL/L (ref 3.5–5.1)
RBC # BLD AUTO: 4.33 M/UL (ref 3.8–5.2)
RBC MORPH BLD: ABNORMAL
SODIUM SERPL-SCNC: 141 MMOL/L (ref 136–145)
WBC # BLD AUTO: 8.4 K/UL (ref 3.6–11)

## 2019-08-15 PROCEDURE — 77030012893

## 2019-08-15 PROCEDURE — 77030008756 HC TU IRR SUC STRY -B: Performed by: COLON & RECTAL SURGERY

## 2019-08-15 PROCEDURE — 77030035045 HC TRCR ENDOSC VRSPRT BLDLSS COVD -B: Performed by: COLON & RECTAL SURGERY

## 2019-08-15 PROCEDURE — 77030010121 HC SHR COAG UPLR COVD -B: Performed by: COLON & RECTAL SURGERY

## 2019-08-15 PROCEDURE — 84100 ASSAY OF PHOSPHORUS: CPT

## 2019-08-15 PROCEDURE — 77030031139 HC SUT VCRL2 J&J -A: Performed by: COLON & RECTAL SURGERY

## 2019-08-15 PROCEDURE — 0DBP4ZZ EXCISION OF RECTUM, PERCUTANEOUS ENDOSCOPIC APPROACH: ICD-10-PCS | Performed by: COLON & RECTAL SURGERY

## 2019-08-15 PROCEDURE — 77030009527 HC GEL PRT SYS AMR -E: Performed by: COLON & RECTAL SURGERY

## 2019-08-15 PROCEDURE — 77030019702 HC WRP THER MENM -C: Performed by: COLON & RECTAL SURGERY

## 2019-08-15 PROCEDURE — 77030012406 HC DRN WND PENRS BARD -A: Performed by: COLON & RECTAL SURGERY

## 2019-08-15 PROCEDURE — C1769 GUIDE WIRE: HCPCS | Performed by: COLON & RECTAL SURGERY

## 2019-08-15 PROCEDURE — 74011000250 HC RX REV CODE- 250: Performed by: NURSE ANESTHETIST, CERTIFIED REGISTERED

## 2019-08-15 PROCEDURE — C1758 CATHETER, URETERAL: HCPCS | Performed by: COLON & RECTAL SURGERY

## 2019-08-15 PROCEDURE — 77030018846 HC SOL IRR STRL H20 ICUM -A: Performed by: COLON & RECTAL SURGERY

## 2019-08-15 PROCEDURE — 77030020747 HC TU INSUF ENDOSC TELE -A: Performed by: COLON & RECTAL SURGERY

## 2019-08-15 PROCEDURE — 77030018836 HC SOL IRR NACL ICUM -A: Performed by: COLON & RECTAL SURGERY

## 2019-08-15 PROCEDURE — 74011250636 HC RX REV CODE- 250/636: Performed by: NURSE ANESTHETIST, CERTIFIED REGISTERED

## 2019-08-15 PROCEDURE — 77030040361 HC SLV COMPR DVT MDII -B: Performed by: COLON & RECTAL SURGERY

## 2019-08-15 PROCEDURE — 76010000136 HC OR TIME 4.5 TO 5 HR: Performed by: COLON & RECTAL SURGERY

## 2019-08-15 PROCEDURE — 77030002933 HC SUT MCRYL J&J -A: Performed by: COLON & RECTAL SURGERY

## 2019-08-15 PROCEDURE — 77030010515 HC APPL ENDOCLP LIG J&J -B: Performed by: COLON & RECTAL SURGERY

## 2019-08-15 PROCEDURE — 0DTN4ZZ RESECTION OF SIGMOID COLON, PERCUTANEOUS ENDOSCOPIC APPROACH: ICD-10-PCS | Performed by: COLON & RECTAL SURGERY

## 2019-08-15 PROCEDURE — 77030002986 HC SUT PROL J&J -A: Performed by: COLON & RECTAL SURGERY

## 2019-08-15 PROCEDURE — 65270000029 HC RM PRIVATE

## 2019-08-15 PROCEDURE — 77030010545: Performed by: COLON & RECTAL SURGERY

## 2019-08-15 PROCEDURE — 77030018832 HC SOL IRR H20 ICUM -A: Performed by: COLON & RECTAL SURGERY

## 2019-08-15 PROCEDURE — 77030011640 HC PAD GRND REM COVD -A: Performed by: COLON & RECTAL SURGERY

## 2019-08-15 PROCEDURE — 77030035174 HC STPLR ENDOSC DST EEA COVD -D: Performed by: COLON & RECTAL SURGERY

## 2019-08-15 PROCEDURE — 77030025625 HC DEV TISS SEAL J&J -F: Performed by: COLON & RECTAL SURGERY

## 2019-08-15 PROCEDURE — 74011250637 HC RX REV CODE- 250/637: Performed by: COLON & RECTAL SURGERY

## 2019-08-15 PROCEDURE — 77030009965 HC RELD STPLR ENDOS COVD -D: Performed by: COLON & RECTAL SURGERY

## 2019-08-15 PROCEDURE — 74011250636 HC RX REV CODE- 250/636: Performed by: ANESTHESIOLOGY

## 2019-08-15 PROCEDURE — 77030034850: Performed by: COLON & RECTAL SURGERY

## 2019-08-15 PROCEDURE — 77030020782 HC GWN BAIR PAWS FLX 3M -B

## 2019-08-15 PROCEDURE — 76060000040 HC ANESTHESIA 4.5 TO 5 HR: Performed by: COLON & RECTAL SURGERY

## 2019-08-15 PROCEDURE — 77030005546 HC CATH URETH FOL 3W BARD -A: Performed by: COLON & RECTAL SURGERY

## 2019-08-15 PROCEDURE — 85025 COMPLETE CBC W/AUTO DIFF WBC: CPT

## 2019-08-15 PROCEDURE — 77030016151 HC PROTCTR LNS DFOG COVD -B: Performed by: COLON & RECTAL SURGERY

## 2019-08-15 PROCEDURE — 77030038157 HC DEV PWR CNTR DISP SIGNIA COVD -C: Performed by: COLON & RECTAL SURGERY

## 2019-08-15 PROCEDURE — 77030013567 HC DRN WND RESERV BARD -A: Performed by: COLON & RECTAL SURGERY

## 2019-08-15 PROCEDURE — 77030002966 HC SUT PDS J&J -A: Performed by: COLON & RECTAL SURGERY

## 2019-08-15 PROCEDURE — 77030035051: Performed by: COLON & RECTAL SURGERY

## 2019-08-15 PROCEDURE — 74011000258 HC RX REV CODE- 258: Performed by: COLON & RECTAL SURGERY

## 2019-08-15 PROCEDURE — 74011000250 HC RX REV CODE- 250: Performed by: COLON & RECTAL SURGERY

## 2019-08-15 PROCEDURE — 80048 BASIC METABOLIC PNL TOTAL CA: CPT

## 2019-08-15 PROCEDURE — 77030002916 HC SUT ETHLN J&J -A: Performed by: COLON & RECTAL SURGERY

## 2019-08-15 PROCEDURE — 74011250636 HC RX REV CODE- 250/636

## 2019-08-15 PROCEDURE — P9045 ALBUMIN (HUMAN), 5%, 250 ML: HCPCS | Performed by: NURSE ANESTHETIST, CERTIFIED REGISTERED

## 2019-08-15 PROCEDURE — 36415 COLL VENOUS BLD VENIPUNCTURE: CPT

## 2019-08-15 PROCEDURE — 74011250636 HC RX REV CODE- 250/636: Performed by: COLON & RECTAL SURGERY

## 2019-08-15 PROCEDURE — 77030037366 HC STPLR ENDO TRI-STPLR COVD -C: Performed by: COLON & RECTAL SURGERY

## 2019-08-15 PROCEDURE — 88305 TISSUE EXAM BY PATHOLOGIST: CPT

## 2019-08-15 PROCEDURE — 77030035048 HC TRCR ENDOSC OPTCL COVD -B: Performed by: COLON & RECTAL SURGERY

## 2019-08-15 PROCEDURE — 76210000016 HC OR PH I REC 1 TO 1.5 HR: Performed by: COLON & RECTAL SURGERY

## 2019-08-15 PROCEDURE — 77030015927 HC DEV TISS SEAL SURX -F: Performed by: COLON & RECTAL SURGERY

## 2019-08-15 PROCEDURE — 88309 TISSUE EXAM BY PATHOLOGIST: CPT

## 2019-08-15 PROCEDURE — 83735 ASSAY OF MAGNESIUM: CPT

## 2019-08-15 PROCEDURE — 77030019927 HC TBNG IRR CYSTO BAXT -A: Performed by: COLON & RECTAL SURGERY

## 2019-08-15 PROCEDURE — 77030002996 HC SUT SLK J&J -A: Performed by: COLON & RECTAL SURGERY

## 2019-08-15 RX ORDER — SODIUM CHLORIDE, SODIUM LACTATE, POTASSIUM CHLORIDE, CALCIUM CHLORIDE 600; 310; 30; 20 MG/100ML; MG/100ML; MG/100ML; MG/100ML
INJECTION, SOLUTION INTRAVENOUS
Status: DISCONTINUED | OUTPATIENT
Start: 2019-08-15 | End: 2019-08-15 | Stop reason: HOSPADM

## 2019-08-15 RX ORDER — SUCCINYLCHOLINE CHLORIDE 20 MG/ML
INJECTION INTRAMUSCULAR; INTRAVENOUS AS NEEDED
Status: DISCONTINUED | OUTPATIENT
Start: 2019-08-15 | End: 2019-08-15 | Stop reason: HOSPADM

## 2019-08-15 RX ORDER — KETOROLAC TROMETHAMINE 30 MG/ML
15 INJECTION, SOLUTION INTRAMUSCULAR; INTRAVENOUS EVERY 6 HOURS
Status: COMPLETED | OUTPATIENT
Start: 2019-08-15 | End: 2019-08-16

## 2019-08-15 RX ORDER — HYDROMORPHONE HYDROCHLORIDE 1 MG/ML
1 INJECTION, SOLUTION INTRAMUSCULAR; INTRAVENOUS; SUBCUTANEOUS
Status: DISCONTINUED | OUTPATIENT
Start: 2019-08-15 | End: 2019-08-18

## 2019-08-15 RX ORDER — NEOSTIGMINE METHYLSULFATE 1 MG/ML
INJECTION INTRAVENOUS AS NEEDED
Status: DISCONTINUED | OUTPATIENT
Start: 2019-08-15 | End: 2019-08-15 | Stop reason: HOSPADM

## 2019-08-15 RX ORDER — MIDAZOLAM HYDROCHLORIDE 1 MG/ML
INJECTION, SOLUTION INTRAMUSCULAR; INTRAVENOUS AS NEEDED
Status: DISCONTINUED | OUTPATIENT
Start: 2019-08-15 | End: 2019-08-15 | Stop reason: HOSPADM

## 2019-08-15 RX ORDER — ALBUMIN HUMAN 50 G/1000ML
SOLUTION INTRAVENOUS AS NEEDED
Status: DISCONTINUED | OUTPATIENT
Start: 2019-08-15 | End: 2019-08-15 | Stop reason: HOSPADM

## 2019-08-15 RX ORDER — MAGNESIUM SULFATE HEPTAHYDRATE 40 MG/ML
INJECTION, SOLUTION INTRAVENOUS AS NEEDED
Status: DISCONTINUED | OUTPATIENT
Start: 2019-08-15 | End: 2019-08-15 | Stop reason: HOSPADM

## 2019-08-15 RX ORDER — ALVIMOPAN 12 MG/1
12 CAPSULE ORAL 2 TIMES DAILY
Status: DISCONTINUED | OUTPATIENT
Start: 2019-08-16 | End: 2019-08-18

## 2019-08-15 RX ORDER — DEXMEDETOMIDINE HYDROCHLORIDE 100 UG/ML
INJECTION, SOLUTION INTRAVENOUS AS NEEDED
Status: DISCONTINUED | OUTPATIENT
Start: 2019-08-15 | End: 2019-08-15 | Stop reason: HOSPADM

## 2019-08-15 RX ORDER — LIDOCAINE HYDROCHLORIDE ANHYDROUS AND DEXTROSE MONOHYDRATE .8; 5 G/100ML; G/100ML
INJECTION, SOLUTION INTRAVENOUS
Status: DISCONTINUED | OUTPATIENT
Start: 2019-08-15 | End: 2019-08-15 | Stop reason: HOSPADM

## 2019-08-15 RX ORDER — ONDANSETRON 2 MG/ML
INJECTION INTRAMUSCULAR; INTRAVENOUS AS NEEDED
Status: DISCONTINUED | OUTPATIENT
Start: 2019-08-15 | End: 2019-08-15 | Stop reason: HOSPADM

## 2019-08-15 RX ORDER — HYDROMORPHONE HYDROCHLORIDE 1 MG/ML
0.2 INJECTION, SOLUTION INTRAMUSCULAR; INTRAVENOUS; SUBCUTANEOUS
Status: DISCONTINUED | OUTPATIENT
Start: 2019-08-15 | End: 2019-08-15 | Stop reason: HOSPADM

## 2019-08-15 RX ORDER — LISINOPRIL 10 MG/1
10 TABLET ORAL DAILY
Status: DISCONTINUED | OUTPATIENT
Start: 2019-08-17 | End: 2019-08-16

## 2019-08-15 RX ORDER — LEVOFLOXACIN 5 MG/ML
INJECTION, SOLUTION INTRAVENOUS AS NEEDED
Status: DISCONTINUED | OUTPATIENT
Start: 2019-08-15 | End: 2019-08-15

## 2019-08-15 RX ORDER — ENOXAPARIN SODIUM 100 MG/ML
40 INJECTION SUBCUTANEOUS EVERY 24 HOURS
Status: DISCONTINUED | OUTPATIENT
Start: 2019-08-16 | End: 2019-08-19 | Stop reason: HOSPADM

## 2019-08-15 RX ORDER — ACETAMINOPHEN 10 MG/ML
INJECTION, SOLUTION INTRAVENOUS AS NEEDED
Status: DISCONTINUED | OUTPATIENT
Start: 2019-08-15 | End: 2019-08-15 | Stop reason: HOSPADM

## 2019-08-15 RX ORDER — ONDANSETRON 4 MG/1
4 TABLET, ORALLY DISINTEGRATING ORAL
Status: DISCONTINUED | OUTPATIENT
Start: 2019-08-15 | End: 2019-08-19 | Stop reason: HOSPADM

## 2019-08-15 RX ORDER — ALVIMOPAN 12 MG/1
12 CAPSULE ORAL ONCE
Status: COMPLETED | OUTPATIENT
Start: 2019-08-15 | End: 2019-08-15

## 2019-08-15 RX ORDER — EPHEDRINE SULFATE/0.9% NACL/PF 50 MG/5 ML
SYRINGE (ML) INTRAVENOUS AS NEEDED
Status: DISCONTINUED | OUTPATIENT
Start: 2019-08-15 | End: 2019-08-15 | Stop reason: HOSPADM

## 2019-08-15 RX ORDER — SODIUM CHLORIDE 9 MG/ML
INJECTION, SOLUTION INTRAVENOUS
Status: DISCONTINUED | OUTPATIENT
Start: 2019-08-15 | End: 2019-08-15 | Stop reason: HOSPADM

## 2019-08-15 RX ORDER — SODIUM CHLORIDE 0.9 % (FLUSH) 0.9 %
5-40 SYRINGE (ML) INJECTION AS NEEDED
Status: DISCONTINUED | OUTPATIENT
Start: 2019-08-15 | End: 2019-08-15 | Stop reason: HOSPADM

## 2019-08-15 RX ORDER — KETAMINE HYDROCHLORIDE 10 MG/ML
INJECTION, SOLUTION INTRAMUSCULAR; INTRAVENOUS AS NEEDED
Status: DISCONTINUED | OUTPATIENT
Start: 2019-08-15 | End: 2019-08-15 | Stop reason: HOSPADM

## 2019-08-15 RX ORDER — MIDAZOLAM HYDROCHLORIDE 1 MG/ML
1 INJECTION, SOLUTION INTRAMUSCULAR; INTRAVENOUS AS NEEDED
Status: DISCONTINUED | OUTPATIENT
Start: 2019-08-15 | End: 2019-08-15 | Stop reason: HOSPADM

## 2019-08-15 RX ORDER — ONDANSETRON 2 MG/ML
4 INJECTION INTRAMUSCULAR; INTRAVENOUS AS NEEDED
Status: DISCONTINUED | OUTPATIENT
Start: 2019-08-15 | End: 2019-08-15 | Stop reason: HOSPADM

## 2019-08-15 RX ORDER — SODIUM CHLORIDE 0.9 % (FLUSH) 0.9 %
5-40 SYRINGE (ML) INJECTION EVERY 8 HOURS
Status: DISCONTINUED | OUTPATIENT
Start: 2019-08-15 | End: 2019-08-15 | Stop reason: HOSPADM

## 2019-08-15 RX ORDER — LIDOCAINE HYDROCHLORIDE 20 MG/ML
INJECTION, SOLUTION EPIDURAL; INFILTRATION; INTRACAUDAL; PERINEURAL AS NEEDED
Status: DISCONTINUED | OUTPATIENT
Start: 2019-08-15 | End: 2019-08-15 | Stop reason: HOSPADM

## 2019-08-15 RX ORDER — MORPHINE SULFATE 10 MG/ML
2 INJECTION, SOLUTION INTRAMUSCULAR; INTRAVENOUS
Status: DISCONTINUED | OUTPATIENT
Start: 2019-08-15 | End: 2019-08-15 | Stop reason: HOSPADM

## 2019-08-15 RX ORDER — FENTANYL CITRATE 50 UG/ML
50 INJECTION, SOLUTION INTRAMUSCULAR; INTRAVENOUS AS NEEDED
Status: DISCONTINUED | OUTPATIENT
Start: 2019-08-15 | End: 2019-08-15 | Stop reason: HOSPADM

## 2019-08-15 RX ORDER — OXYCODONE HYDROCHLORIDE 5 MG/1
5-10 TABLET ORAL
Status: DISCONTINUED | OUTPATIENT
Start: 2019-08-15 | End: 2019-08-16

## 2019-08-15 RX ORDER — ROCURONIUM BROMIDE 10 MG/ML
INJECTION, SOLUTION INTRAVENOUS AS NEEDED
Status: DISCONTINUED | OUTPATIENT
Start: 2019-08-15 | End: 2019-08-15 | Stop reason: HOSPADM

## 2019-08-15 RX ORDER — GLYCOPYRROLATE 0.2 MG/ML
INJECTION INTRAMUSCULAR; INTRAVENOUS AS NEEDED
Status: DISCONTINUED | OUTPATIENT
Start: 2019-08-15 | End: 2019-08-15 | Stop reason: HOSPADM

## 2019-08-15 RX ORDER — PROPOFOL 10 MG/ML
INJECTION, EMULSION INTRAVENOUS AS NEEDED
Status: DISCONTINUED | OUTPATIENT
Start: 2019-08-15 | End: 2019-08-15 | Stop reason: HOSPADM

## 2019-08-15 RX ORDER — SODIUM CHLORIDE, SODIUM LACTATE, POTASSIUM CHLORIDE, CALCIUM CHLORIDE 600; 310; 30; 20 MG/100ML; MG/100ML; MG/100ML; MG/100ML
125 INJECTION, SOLUTION INTRAVENOUS CONTINUOUS
Status: DISCONTINUED | OUTPATIENT
Start: 2019-08-15 | End: 2019-08-15 | Stop reason: HOSPADM

## 2019-08-15 RX ORDER — OXYCODONE AND ACETAMINOPHEN 5; 325 MG/1; MG/1
1 TABLET ORAL AS NEEDED
Status: DISCONTINUED | OUTPATIENT
Start: 2019-08-15 | End: 2019-08-15 | Stop reason: HOSPADM

## 2019-08-15 RX ORDER — SODIUM CHLORIDE, SODIUM LACTATE, POTASSIUM CHLORIDE, CALCIUM CHLORIDE 600; 310; 30; 20 MG/100ML; MG/100ML; MG/100ML; MG/100ML
75 INJECTION, SOLUTION INTRAVENOUS CONTINUOUS
Status: DISCONTINUED | OUTPATIENT
Start: 2019-08-15 | End: 2019-08-15 | Stop reason: HOSPADM

## 2019-08-15 RX ORDER — BUPIVACAINE HYDROCHLORIDE AND EPINEPHRINE 5; 5 MG/ML; UG/ML
INJECTION, SOLUTION EPIDURAL; INTRACAUDAL; PERINEURAL AS NEEDED
Status: DISCONTINUED | OUTPATIENT
Start: 2019-08-15 | End: 2019-08-15 | Stop reason: HOSPADM

## 2019-08-15 RX ORDER — MIDAZOLAM HYDROCHLORIDE 1 MG/ML
0.5 INJECTION, SOLUTION INTRAMUSCULAR; INTRAVENOUS
Status: DISCONTINUED | OUTPATIENT
Start: 2019-08-15 | End: 2019-08-15 | Stop reason: HOSPADM

## 2019-08-15 RX ORDER — FENTANYL CITRATE 50 UG/ML
INJECTION, SOLUTION INTRAMUSCULAR; INTRAVENOUS AS NEEDED
Status: DISCONTINUED | OUTPATIENT
Start: 2019-08-15 | End: 2019-08-15 | Stop reason: HOSPADM

## 2019-08-15 RX ORDER — GABAPENTIN 300 MG/1
600 CAPSULE ORAL ONCE
Status: COMPLETED | OUTPATIENT
Start: 2019-08-15 | End: 2019-08-15

## 2019-08-15 RX ORDER — SODIUM CHLORIDE, SODIUM LACTATE, POTASSIUM CHLORIDE, CALCIUM CHLORIDE 600; 310; 30; 20 MG/100ML; MG/100ML; MG/100ML; MG/100ML
75 INJECTION, SOLUTION INTRAVENOUS CONTINUOUS
Status: DISPENSED | OUTPATIENT
Start: 2019-08-15 | End: 2019-08-16

## 2019-08-15 RX ORDER — LIDOCAINE HYDROCHLORIDE ANHYDROUS AND DEXTROSE MONOHYDRATE .8; 5 G/100ML; G/100ML
1 INJECTION, SOLUTION INTRAVENOUS CONTINUOUS
Status: DISPENSED | OUTPATIENT
Start: 2019-08-15 | End: 2019-08-17

## 2019-08-15 RX ORDER — NALOXONE HYDROCHLORIDE 0.4 MG/ML
0.4 INJECTION, SOLUTION INTRAMUSCULAR; INTRAVENOUS; SUBCUTANEOUS AS NEEDED
Status: DISCONTINUED | OUTPATIENT
Start: 2019-08-15 | End: 2019-08-19 | Stop reason: HOSPADM

## 2019-08-15 RX ORDER — DIPHENHYDRAMINE HYDROCHLORIDE 50 MG/ML
12.5 INJECTION, SOLUTION INTRAMUSCULAR; INTRAVENOUS AS NEEDED
Status: DISCONTINUED | OUTPATIENT
Start: 2019-08-15 | End: 2019-08-15 | Stop reason: HOSPADM

## 2019-08-15 RX ORDER — HYDRALAZINE HYDROCHLORIDE 20 MG/ML
INJECTION INTRAMUSCULAR; INTRAVENOUS AS NEEDED
Status: DISCONTINUED | OUTPATIENT
Start: 2019-08-15 | End: 2019-08-15 | Stop reason: HOSPADM

## 2019-08-15 RX ORDER — OXYCODONE HYDROCHLORIDE 5 MG/1
5 TABLET ORAL
Status: DISCONTINUED | OUTPATIENT
Start: 2019-08-15 | End: 2019-08-15

## 2019-08-15 RX ORDER — DEXAMETHASONE SODIUM PHOSPHATE 4 MG/ML
INJECTION, SOLUTION INTRA-ARTICULAR; INTRALESIONAL; INTRAMUSCULAR; INTRAVENOUS; SOFT TISSUE AS NEEDED
Status: DISCONTINUED | OUTPATIENT
Start: 2019-08-15 | End: 2019-08-15 | Stop reason: HOSPADM

## 2019-08-15 RX ORDER — SODIUM CHLORIDE 9 MG/ML
25 INJECTION, SOLUTION INTRAVENOUS CONTINUOUS
Status: DISCONTINUED | OUTPATIENT
Start: 2019-08-15 | End: 2019-08-15 | Stop reason: HOSPADM

## 2019-08-15 RX ORDER — BUPIVACAINE HYDROCHLORIDE AND EPINEPHRINE 5; 5 MG/ML; UG/ML
30 INJECTION, SOLUTION EPIDURAL; INTRACAUDAL; PERINEURAL ONCE
Status: DISCONTINUED | OUTPATIENT
Start: 2019-08-15 | End: 2019-08-15 | Stop reason: HOSPADM

## 2019-08-15 RX ORDER — FENTANYL CITRATE 50 UG/ML
25 INJECTION, SOLUTION INTRAMUSCULAR; INTRAVENOUS
Status: DISCONTINUED | OUTPATIENT
Start: 2019-08-15 | End: 2019-08-15 | Stop reason: HOSPADM

## 2019-08-15 RX ORDER — ACETAMINOPHEN 500 MG
1000 TABLET ORAL ONCE
Status: COMPLETED | OUTPATIENT
Start: 2019-08-15 | End: 2019-08-15

## 2019-08-15 RX ORDER — LIDOCAINE HYDROCHLORIDE 10 MG/ML
0.1 INJECTION, SOLUTION EPIDURAL; INFILTRATION; INTRACAUDAL; PERINEURAL AS NEEDED
Status: DISCONTINUED | OUTPATIENT
Start: 2019-08-15 | End: 2019-08-15 | Stop reason: HOSPADM

## 2019-08-15 RX ORDER — ACETAMINOPHEN 325 MG/1
650 TABLET ORAL ONCE
Status: DISCONTINUED | OUTPATIENT
Start: 2019-08-15 | End: 2019-08-15 | Stop reason: HOSPADM

## 2019-08-15 RX ORDER — ACETAMINOPHEN 500 MG
1000 TABLET ORAL EVERY 6 HOURS
Status: DISCONTINUED | OUTPATIENT
Start: 2019-08-15 | End: 2019-08-19 | Stop reason: HOSPADM

## 2019-08-15 RX ADMIN — OXYCODONE HYDROCHLORIDE 5 MG: 5 TABLET ORAL at 19:43

## 2019-08-15 RX ADMIN — PROPOFOL 160 MG: 10 INJECTION, EMULSION INTRAVENOUS at 12:59

## 2019-08-15 RX ADMIN — Medication 5 MG: at 13:42

## 2019-08-15 RX ADMIN — ALBUMIN (HUMAN) 250 ML: 12.5 INJECTION, SOLUTION INTRAVENOUS at 15:16

## 2019-08-15 RX ADMIN — ALBUMIN (HUMAN) 250 ML: 12.5 INJECTION, SOLUTION INTRAVENOUS at 13:28

## 2019-08-15 RX ADMIN — SODIUM CHLORIDE, POTASSIUM CHLORIDE, SODIUM LACTATE AND CALCIUM CHLORIDE: 600; 310; 30; 20 INJECTION, SOLUTION INTRAVENOUS at 12:55

## 2019-08-15 RX ADMIN — SODIUM CHLORIDE, SODIUM LACTATE, POTASSIUM CHLORIDE, AND CALCIUM CHLORIDE 75 ML/HR: 600; 310; 30; 20 INJECTION, SOLUTION INTRAVENOUS at 18:00

## 2019-08-15 RX ADMIN — HYDRALAZINE HYDROCHLORIDE 10 MG: 20 INJECTION INTRAMUSCULAR; INTRAVENOUS at 15:04

## 2019-08-15 RX ADMIN — DEXAMETHASONE SODIUM PHOSPHATE 4 MG: 4 INJECTION, SOLUTION INTRAMUSCULAR; INTRAVENOUS at 13:20

## 2019-08-15 RX ADMIN — ACETAMINOPHEN 1000 MG: 10 INJECTION, SOLUTION INTRAVENOUS at 17:53

## 2019-08-15 RX ADMIN — MIDAZOLAM 2 MG: 1 INJECTION INTRAMUSCULAR; INTRAVENOUS at 12:55

## 2019-08-15 RX ADMIN — KETAMINE HYDROCHLORIDE 25 MG: 10 INJECTION, SOLUTION INTRAMUSCULAR; INTRAVENOUS at 12:59

## 2019-08-15 RX ADMIN — NEOSTIGMINE METHYLSULFATE 3 MG: 1 INJECTION, SOLUTION INTRAMUSCULAR; INTRAVENOUS; SUBCUTANEOUS at 17:14

## 2019-08-15 RX ADMIN — GABAPENTIN 600 MG: 300 CAPSULE ORAL at 11:28

## 2019-08-15 RX ADMIN — FENTANYL CITRATE 100 MCG: 50 INJECTION, SOLUTION INTRAMUSCULAR; INTRAVENOUS at 12:59

## 2019-08-15 RX ADMIN — ONDANSETRON HYDROCHLORIDE 4 MG: 2 INJECTION, SOLUTION INTRAMUSCULAR; INTRAVENOUS at 17:02

## 2019-08-15 RX ADMIN — SUCCINYLCHOLINE CHLORIDE 120 MG: 20 INJECTION, SOLUTION INTRAMUSCULAR; INTRAVENOUS at 13:00

## 2019-08-15 RX ADMIN — GLYCOPYRROLATE 0.4 MG: 0.2 INJECTION, SOLUTION INTRAMUSCULAR; INTRAVENOUS at 17:14

## 2019-08-15 RX ADMIN — HYDROMORPHONE HYDROCHLORIDE 1 MG: 1 INJECTION, SOLUTION INTRAMUSCULAR; INTRAVENOUS; SUBCUTANEOUS at 23:06

## 2019-08-15 RX ADMIN — ROCURONIUM BROMIDE 10 MG: 10 SOLUTION INTRAVENOUS at 15:48

## 2019-08-15 RX ADMIN — ROCURONIUM BROMIDE 20 MG: 10 SOLUTION INTRAVENOUS at 14:20

## 2019-08-15 RX ADMIN — ONDANSETRON 4 MG: 2 INJECTION INTRAMUSCULAR; INTRAVENOUS at 18:44

## 2019-08-15 RX ADMIN — FENTANYL CITRATE 25 MCG: 50 INJECTION INTRAMUSCULAR; INTRAVENOUS at 18:44

## 2019-08-15 RX ADMIN — ALVIMOPAN 12 MG: 12 CAPSULE ORAL at 11:27

## 2019-08-15 RX ADMIN — KETOROLAC TROMETHAMINE 15 MG: 30 INJECTION, SOLUTION INTRAMUSCULAR at 21:14

## 2019-08-15 RX ADMIN — DEXMEDETOMIDINE HYDROCHLORIDE 8 MCG: 100 INJECTION, SOLUTION, CONCENTRATE INTRAVENOUS at 17:08

## 2019-08-15 RX ADMIN — ACETAMINOPHEN 1000 MG: 500 TABLET ORAL at 19:43

## 2019-08-15 RX ADMIN — ACETAMINOPHEN 1000 MG: 500 TABLET ORAL at 11:28

## 2019-08-15 RX ADMIN — ROCURONIUM BROMIDE 5 MG: 10 SOLUTION INTRAVENOUS at 12:59

## 2019-08-15 RX ADMIN — LIDOCAINE HYDROCHLORIDE 1 MG/KG/HR: 8 INJECTION, SOLUTION INTRAVENOUS at 18:03

## 2019-08-15 RX ADMIN — LIDOCAINE HYDROCHLORIDE 2 MG/KG/HR: 8 INJECTION, SOLUTION INTRAVENOUS at 13:05

## 2019-08-15 RX ADMIN — SODIUM CHLORIDE, SODIUM LACTATE, POTASSIUM CHLORIDE, AND CALCIUM CHLORIDE 75 ML/HR: 600; 310; 30; 20 INJECTION, SOLUTION INTRAVENOUS at 19:49

## 2019-08-15 RX ADMIN — CEFOTETAN DISODIUM 2 G: 2 INJECTION, POWDER, FOR SOLUTION INTRAMUSCULAR; INTRAVENOUS at 13:20

## 2019-08-15 RX ADMIN — ROCURONIUM BROMIDE 10 MG: 10 SOLUTION INTRAVENOUS at 16:23

## 2019-08-15 RX ADMIN — DEXMEDETOMIDINE HYDROCHLORIDE 8 MCG: 100 INJECTION, SOLUTION, CONCENTRATE INTRAVENOUS at 14:49

## 2019-08-15 RX ADMIN — SODIUM CHLORIDE: 900 INJECTION, SOLUTION INTRAVENOUS at 13:08

## 2019-08-15 RX ADMIN — LIDOCAINE HYDROCHLORIDE 100 MG: 20 INJECTION, SOLUTION EPIDURAL; INFILTRATION; INTRACAUDAL; PERINEURAL at 12:59

## 2019-08-15 RX ADMIN — MAGNESIUM SULFATE HEPTAHYDRATE 2 G: 40 INJECTION, SOLUTION INTRAVENOUS at 13:13

## 2019-08-15 NOTE — BRIEF OP NOTE
BRIEF OPERATIVE NOTE    Date of Procedure: 8/15/2019   Preoperative Diagnosis: HISTORY OF RECURRENT DIVERTICULITIS   COLON CANCER  Postoperative Diagnosis: * No post-op diagnosis entered *    Procedure(s):  HAND ASSISTED LAPAROSCOPIC POSSIBLE OPEN SIGMOID RESECTION , CYSTOSCOPY PLACEMENT BILATERAL URETERAL CATHETERS  ( [facility] )    (LATEX ALLERGY)  CYSTOSCOPY WITH BILATERAL  URETERAL STENTS  Surgeon(s) and Role:  Panel 1:     * Denver Platt MD - Primary  Panel 2:     * Aruna Pedersen MD - Primary             Surgical Staff:  Circ-1: Kirby Henson RN  Circ-2: Pamela Hurtado  Scrub Tech-1: Flea Allen  Surg Asst-1: Tiajuana Duverney  Surg Asst-2: Sony Miranda Staff: Michael Patel RN; Osmel Baltazar RN  Event Time In Time Out   Incision Start 1317    Incision Close       Anesthesia: General   Estimated Blood Loss: 0  Specimens: * No specimens in log *   Findings: Normal bladder   Complications: None  Implants: * No implants in log *

## 2019-08-15 NOTE — H&P
Colorectal Surgery Pre-Operative History and Physical Examination Note          NAME:  Fahad Wong   :   1950   MRN:   [id number]     Operation Date: 8/15/2019    Chief Complaint:  History of diverticulitis and colon cancer. History of Present Illness: The patient is a 61-year-old female who has had multiple episodes of diverticulitis and who also recently underwent the colonoscopic excision of a malignant polyp. She reports that her first episode of diverticulitis occurred approximately 14 years ago. It was diagnosed via CT-scan at [facility] and treated with 3 days of parenteral antibiotics then oral antibiotics for several more days. She believes that she has had several other attacks; probably 5 in the last 5 years. She has treated herself on some of these occasions with antibiotics that were prescribed or given to her by various friends and acquaintances. At the end of February this year, she developed symptoms and was diagnosed with diverticulitis via a CT scan performed at [facility].  She was treated with 10 days of oral ciprofloxacin and metronidazole (Flagyl) and the symptoms mostly resolved. Still, she has continued to have some sharp pains in the left lower quadrant and the suprapubic area that usually last for less than 60 seconds and that do not seem to have any identifiable exacerbating or relieving factors. She has fecal urgency and has had a small number of episodes of fecal incontinence. She eventually saw Dr. Junior Diamond in consultation, and on 2019 she underwent a colonoscopy (which was her first). The procedure revealed severe sigmoid diverticulosis, a flat 15 mm hepatic flexure polyp, and a pedunculated 15 mm polyp that was located in the sigmoid colon approximately 17 cm from the anal verge.   Both polyps were excised, and both proved to have been tubular adenomas; however, the sigmoid colon polyp also contained adenocarcinoma that invaded the polyp head and that was classified as Haggitt level I (pT1). A CT scan of abdomen and pelvis with oral and IV contrast was performed on 5/16/2019, and it was interpreted as revealing sigmoid diverticulosis with mild mural thickening but no definite evidence of diverticulitis.     Another CT scan of abdomen and pelvis with oral and IV contrast was performed on 7/15/2019, and it was interpreted as revealing sigmoid diverticulosis, hepatic steatosis, and a small gallstone. She reports that her stool frequency usually ranges from once every three days to three times per day but that it probably averages once per day. In preparation for surgery, Dr. Kristie Hannon performed another colonoscopy on her on 8/5/2019. The procedure was performed in order to reevaluate the polypectomy site and to erica it. As it turned out, the site was not identifiable. Dr. Krsitie Hannon therefore tattooed the colon at 16 cm from anal verge, which was the distance at which the malignant polyp had previously been located. PMH:  Past Medical History:   Diagnosis Date    Asthma     Cholelithiasis     Incidental finding on CT scan.  Colon cancer ([address] 04/26/2019    Diverticulosis of colon     History of broken nose 2008    History of colonic polyps     History of diverticulitis of colon     [place] (hard of hearing)     Hypertension     Ill-defined condition 2008    FELL FROM A LADDER , BROKEN LEFT HAND     RBBB 8/7/2019    Vaginal delivery     Four times. Four episiotomies.        PSH:  Past Surgical History:   Procedure Laterality Date    COLONOSCOPY N/A 4/26/2019    COLONOSCOPY performed by Jn Thomson MD at [place] [address]. Left 8/5/2019    COLONOSCOPY (LATEX ALLERGY) performed by Jn Thomson MD at [address] [place] [address] ENDOSCOPY    HX GI      COLONOSCOPY     HX HEENT  2018    [address] [place] [address] (PLASTIC SURGERY)     HX HEENT  2008    BROKEN NOSE     HX OTHER SURGICAL      HX DANIEL AND BSO  1996    Performed for treatment of a uterine fibroid and excessive bleeding. Home Medications:  Cannot display prior to admission medications because the patient has not been admitted in this contact. Hospital Medications:  No current facility-administered medications for this encounter. Current Outpatient Medications   Medication Sig    ubidecarenone (COQ-10 PO) Take  by mouth Daily (before breakfast).  lisinopril (PRINIVIL, ZESTRIL) 10 mg tablet Take  by mouth daily. Allergies: Allergies   Allergen Reactions    Latex Vertigo    Benadr [Diphenhydramine Hcl] Rash    Sulfa (Sulfonamide Antibiotics) Rash       Family History:  Family History   Problem Relation Age of Onset    Alzheimer Mother     COPD Father     Diabetes Sister     Anesth Problems Neg Hx        Social History:  Social History     Tobacco Use    Smoking status: Former Smoker     Packs/day: 0.50     Years: 5.00     Pack years: 2.50     Last attempt to quit:      Years since quittin.6    Smokeless tobacco: Never Used    Tobacco comment: 40 years ago in college   Substance Use Topics    Alcohol use: Yes     Alcohol/week: 7.0 standard drinks     Types: 7 Glasses of wine per week       Review of Systems:    Symptom Y/N Comments  Symptom Y/N Comments   Fever/Chills N   Chest Pain N    Cough N   Abdominal Pain Y    Sputum N   Joint Pain     SOB/JOY N   Pruritis/Rash     Nausea/vomit N   Tolerating PT/OT     Diarrhea N   Tolerating Diet Y    Constipation N   Other       Could NOT obtain due to:        Objective:   No data found. No intake/output data recorded. No intake/output data recorded. PHYSICAL EXAMINATION:    White female in no apparent distress. There is no scleral icterus. The lungs are clear to auscultation bilaterally. The heart sounds are regular in rate and rhythm with no murmurs, gallops, or rubs. The abdomen is soft, non-tender, and non-distended. There are no palpable masses or hernias. There is no appreciable organomegaly.   There is a well healed Pfannensteil-type scar. The extremities are without edema. The patient is alert and oriented, and there are no gross neurologic deficits. Data Review    8/6/2019 10:23   WBC 3.8   HGB 13.9   HCT 42.4   PLATELET 198   INR 1.0   Prothrombin time 10.4   aPTT 27.2   Sodium 142   Potassium 4.2   Chloride 108   CO2 29   Glucose 93   BUN 15   Creatinine 0.53 (L)   Calcium 9.9   Phosphorus 2.8   Magnesium 2.2   GFR est non-AA >60   Bilirubin, total 0.7   Albumin 4.2   ALT (SGPT) 28   AST 14 (L)   Alk. phosphatase 79   Hemoglobin A1c, (calculated) 5.0   Est. average glucose 97   CEA 1.8   Crossmatch Expiration 08/18/2019   TYPE & SCREEN Rpt                       Assessment and Plan:      The patient has two indications for a colon resection. Fortunately for her, both apply to what is essentially the same colonic segment. The first indication is the recurrent episodes of diverticulitis. The probability that she will have future attacks is high, and a sigmoid colon resection would be appropriate in order to reduce that probability. The second indication is the adenocarcinoma. This was a T1 lesion in a pedunculated polyp. The stalk margin was negative, but there is still a chance of up to 10% that there has already been metastasis to one or more lymph nodes. As I explained to the patient, this means that there is a chance of approximately 90% that the polypectomy alone cured her of the cancer but a chance of approximately 10% that she actually has stage III disease. An oncologic resection of the sigmoid colon would therefore be appropriate for diagnostic and potentially therapeutic purposes.     We have discussed the risks of surgery in great detail including, but not limited to, bleeding (possibly requiring blood transfusion or return to the operating room), infection (wound, urinary tract, intraabdominal or pelvic, pulmonary), anastomotic leakage (possibly requiring the creation of a temporary ostomy), anastomotic stricture, injury to other organs or structures including ureters, urinary bladder, small intestine, and spleen (possibly requiring splenectomy), small bowel obstruction, hernia formation, functional problems, myocardial infarction, stroke, deep venous thrombosis, pulmonary embolism, and death. It has been explained that although a hand-assisted laparoscopic operation is planned it could become necessary to convert to the open operation. It has also been explained that a urologist will be consulted to perform the cystoscopic placement of temporary bilateral ureteral catheters in order to facilitate intraoperative identification of the ureters. .     The patient appeared to have understood all of the above, and she has agreed to proceed.     Principal Problem:    History of diverticulitis of colon (8/15/2019)    Active Problems:    Colon cancer ([address] (8/15/2019)      Hypertension (8/15/2019)

## 2019-08-15 NOTE — PERIOP NOTES
Patient interviewed in holding area, identity and procedure verified.  Conversation held by [employer]

## 2019-08-15 NOTE — ANESTHESIA POSTPROCEDURE EVALUATION
Post-Anesthesia Evaluation and Assessment    Patient: Rima Mccauley MRN: [id number]  SSN: [id number]    YOB: 1950  Age: 77 y.o. Sex: female      I have evaluated the patient and they are stable and ready for discharge from the PACU. Cardiovascular Function/Vital Signs  Visit Vitals  /74 (BP 1 Location: Right arm, BP Patient Position: At rest)   Pulse 79   Temp 36.8 °C (98.3 °F)   Resp 14   Ht 5' 5\" (1.651 m)   Wt 60.1 kg (132 lb 9 oz)   SpO2 96%   BMI 22.06 kg/m²       Patient is status post General anesthesia for Procedure(s):  HAND ASSISTED LAPAROSCOPIC  SIGMOID RESECTION WITH MOBILIZATION OF SPLENIC FLECTURE AND COLOPROCTOSCOPY, CYSTOSCOPY PLACEMENT BILATERAL URETERAL CATHETERS  ( E. R. A. S. )    (LATEX ALLERGY)  CYSTOSCOPY WITH BILATERAL  URETERAL STENTS. Nausea/Vomiting: None    Postoperative hydration reviewed and adequate. Pain:  Pain Scale 1: Visual (08/15/19 1830)  Pain Intensity 1: 0 (08/15/19 1830)   Managed    Neurological Status:   Neuro (WDL): Within Defined Limits (08/15/19 1830)  Neuro  LUE Motor Response: Purposeful (08/15/19 1830)  LLE Motor Response: Purposeful (08/15/19 1830)  RUE Motor Response: Purposeful (08/15/19 1830)  RLE Motor Response: Purposeful (08/15/19 1830)   At baseline    Mental Status, Level of Consciousness: Alert and  oriented to person, place, and time    Pulmonary Status:   O2 Device: Nasal cannula (08/15/19 1830)   Adequate oxygenation and airway patent    Complications related to anesthesia: None    Post-anesthesia assessment completed.  No concerns    Signed By: Janine Reveles MD     August 15, 2019              Procedure(s):  HAND ASSISTED LAPAROSCOPIC  SIGMOID RESECTION WITH MOBILIZATION OF SPLENIC FLECTURE AND COLOPROCTOSCOPY, CYSTOSCOPY PLACEMENT BILATERAL URETERAL CATHETERS  ( E. R. A. S. )    (LATEX ALLERGY)  CYSTOSCOPY WITH BILATERAL  URETERAL STENTS.    general    <BSHSIANPOST>    Vitals Value Taken Time   /74 8/15/2019  7:29 PM   Temp 36.8 °C (98.3 °F) 8/15/2019  7:29 PM   Pulse 79 8/15/2019  7:29 PM   Resp 14 8/15/2019  7:29 PM   SpO2 96 % 8/15/2019  7:29 PM

## 2019-08-15 NOTE — OP NOTES
UROLOGY OPERATIVE NOTE    Patient: Monster Mckay MRN: [id number]  SSN: [id number]    YOB: 1950  Age: 77 y.o. Sex: female          Pre-operative Diagnosis: Need for ureteral localization catheters  Post-operative Diagnosis: Same  Procedure: Cystoscopy and bilateral ureteral catheters  Surgeon: Lacey Keen MD  Assistant: OR staff  Anesthesia:  General    Procedure Details: The patient was seen in the pre-operative area. The risks, benefits, complications, alternative treatment options, and expected outcomes were again discussed with the patient. The possibilities of reaction to medication, pain, infection, bleeding, major cardiovascular event, death, damage to surrounding structures were specifically addressed. Informed consent was then obtained. The site of surgery properly noted/marked. Upon arrival to the operative suite, the patient, procedure, and side were confirmed via a pre-operative \"time-out\". All were in agreement. The patient was carefully positioned in lithotomy and anethesia was undertaken. Sterile prep and drape was accomplished. The cystoscope was inserted through a normal urethra. Survey of the bladder surface revealed no abnormalities. Bilateral 5F ureteral catheters were placed without resistance (guide wire assist). Efflux of clear jami urine noted. Grubbs anchored and catheters secured to grubbs with clips.     Findings: Clear ureteral jet  Estimated Blood Loss: Minimal

## 2019-08-15 NOTE — ANESTHESIA PREPROCEDURE EVALUATION
Relevant Problems   No relevant active problems       Anesthetic History   No history of anesthetic complications            Review of Systems / Medical History  Patient summary reviewed, nursing notes reviewed and pertinent labs reviewed    Pulmonary  Within defined limits                 Neuro/Psych   Within defined limits           Cardiovascular  Within defined limits  Hypertension                   GI/Hepatic/Renal  Within defined limits              Endo/Other  Within defined limits           Other Findings              Physical Exam    Airway  Mallampati: II  TM Distance: > 6 cm  Neck ROM: normal range of motion   Mouth opening: Normal     Cardiovascular  Regular rate and rhythm,  S1 and S2 normal,  no murmur, click, rub, or gallop             Dental  No notable dental hx       Pulmonary  Breath sounds clear to auscultation               Abdominal  GI exam deferred       Other Findings            Anesthetic Plan    ASA: 2  Anesthesia type: general            Anesthetic plan and risks discussed with: Patient

## 2019-08-15 NOTE — BRIEF OP NOTE
BRIEF OPERATIVE NOTE    Date of Procedure:  8/15/2019   Preoperative Diagnosis:  History of diverticulitis. Sigmoid colon cancer. Postoperative Diagnosis:  History of diverticulitis. Sigmoid colon cancer. Procedure:  Hand-assisted laparoscopic sigmoid colon resection, mobilization of the splenic flexure, and coloproctostomy. Surgeon:  Sujit Ying MD  Assistant:  Juan R Huynh SA; Ade Johns. [facility], [address] [facility]  Anesthesia:  General endotracheal  Estimated Blood Loss:  100 mL  Crystalloid:  900 mL  Albumin:  500 mL  Urine:    245 mL  Specimens:   ID Type Source Tests Collected by Time Destination   1 : Sigmoid colon and proximal rectum Fresh Abdomen  Denver Platt MD 8/15/2019 1718 Pathology   2 : Anastomotic donuts Fresh Abdomen  Denver Platt MD 8/15/2019 1720 Pathology     Tubes and Drains:  7 mm Victor Hugo-Cruz drain in the pelvis. Findings: Thickened distal sigmoid colon with adhesions to the abdominal wall. No identifiable neoplasms. Tattoo located in the distal sigmoid. Complications:  None apparent. Implants:  None.

## 2019-08-16 LAB
ANION GAP SERPL CALC-SCNC: 10 MMOL/L (ref 5–15)
BUN SERPL-MCNC: 7 MG/DL (ref 6–20)
BUN/CREAT SERPL: 8 (ref 12–20)
CALCIUM SERPL-MCNC: 9.4 MG/DL (ref 8.5–10.1)
CHLORIDE SERPL-SCNC: 102 MMOL/L (ref 97–108)
CO2 SERPL-SCNC: 24 MMOL/L (ref 21–32)
CREAT SERPL-MCNC: 0.87 MG/DL (ref 0.55–1.02)
ERYTHROCYTE [DISTWIDTH] IN BLOOD BY AUTOMATED COUNT: 14.2 % (ref 11.5–14.5)
GLUCOSE SERPL-MCNC: 134 MG/DL (ref 65–100)
HCT VFR BLD AUTO: 41.7 % (ref 35–47)
HGB BLD-MCNC: 13.5 G/DL (ref 11.5–16)
MAGNESIUM SERPL-MCNC: 2.2 MG/DL (ref 1.6–2.4)
MCH RBC QN AUTO: 28.6 PG (ref 26–34)
MCHC RBC AUTO-ENTMCNC: 32.4 G/DL (ref 30–36.5)
MCV RBC AUTO: 88.3 FL (ref 80–99)
NRBC # BLD: 0 K/UL (ref 0–0.01)
NRBC BLD-RTO: 0 PER 100 WBC
PHOSPHATE SERPL-MCNC: 2.8 MG/DL (ref 2.6–4.7)
PLATELET # BLD AUTO: 228 K/UL (ref 150–400)
PMV BLD AUTO: 10.8 FL (ref 8.9–12.9)
POTASSIUM SERPL-SCNC: 4.1 MMOL/L (ref 3.5–5.1)
RBC # BLD AUTO: 4.72 M/UL (ref 3.8–5.2)
SODIUM SERPL-SCNC: 136 MMOL/L (ref 136–145)
WBC # BLD AUTO: 8.6 K/UL (ref 3.6–11)

## 2019-08-16 PROCEDURE — 65270000029 HC RM PRIVATE

## 2019-08-16 PROCEDURE — 74011250637 HC RX REV CODE- 250/637: Performed by: COLON & RECTAL SURGERY

## 2019-08-16 PROCEDURE — 74011000250 HC RX REV CODE- 250: Performed by: COLON & RECTAL SURGERY

## 2019-08-16 PROCEDURE — 80048 BASIC METABOLIC PNL TOTAL CA: CPT

## 2019-08-16 PROCEDURE — 83735 ASSAY OF MAGNESIUM: CPT

## 2019-08-16 PROCEDURE — 74011250636 HC RX REV CODE- 250/636: Performed by: COLON & RECTAL SURGERY

## 2019-08-16 PROCEDURE — 85027 COMPLETE CBC AUTOMATED: CPT

## 2019-08-16 PROCEDURE — 36415 COLL VENOUS BLD VENIPUNCTURE: CPT

## 2019-08-16 PROCEDURE — 97161 PT EVAL LOW COMPLEX 20 MIN: CPT

## 2019-08-16 PROCEDURE — 84100 ASSAY OF PHOSPHORUS: CPT

## 2019-08-16 RX ORDER — LISINOPRIL 10 MG/1
10 TABLET ORAL DAILY
Status: DISCONTINUED | OUTPATIENT
Start: 2019-08-16 | End: 2019-08-19 | Stop reason: HOSPADM

## 2019-08-16 RX ORDER — HYDROMORPHONE HYDROCHLORIDE 2 MG/1
2 TABLET ORAL
Status: DISCONTINUED | OUTPATIENT
Start: 2019-08-16 | End: 2019-08-19 | Stop reason: HOSPADM

## 2019-08-16 RX ORDER — SODIUM CHLORIDE 0.9 % (FLUSH) 0.9 %
SYRINGE (ML) INJECTION
Status: DISPENSED
Start: 2019-08-16 | End: 2019-08-17

## 2019-08-16 RX ORDER — CLONIDINE HYDROCHLORIDE 0.1 MG/1
0.1 TABLET ORAL
Status: DISCONTINUED | OUTPATIENT
Start: 2019-08-16 | End: 2019-08-19 | Stop reason: HOSPADM

## 2019-08-16 RX ADMIN — ACETAMINOPHEN 1000 MG: 500 TABLET ORAL at 07:45

## 2019-08-16 RX ADMIN — KETOROLAC TROMETHAMINE 15 MG: 30 INJECTION, SOLUTION INTRAMUSCULAR at 09:50

## 2019-08-16 RX ADMIN — ACETAMINOPHEN 1000 MG: 500 TABLET ORAL at 14:59

## 2019-08-16 RX ADMIN — LIDOCAINE HYDROCHLORIDE 1 MG/KG/HR: 8 INJECTION, SOLUTION INTRAVENOUS at 18:50

## 2019-08-16 RX ADMIN — KETOROLAC TROMETHAMINE 15 MG: 30 INJECTION, SOLUTION INTRAMUSCULAR at 16:54

## 2019-08-16 RX ADMIN — SODIUM CHLORIDE, SODIUM LACTATE, POTASSIUM CHLORIDE, AND CALCIUM CHLORIDE 75 ML/HR: 600; 310; 30; 20 INJECTION, SOLUTION INTRAVENOUS at 09:59

## 2019-08-16 RX ADMIN — KETOROLAC TROMETHAMINE 15 MG: 30 INJECTION, SOLUTION INTRAMUSCULAR at 03:46

## 2019-08-16 RX ADMIN — CLONIDINE HYDROCHLORIDE 0.1 MG: 0.1 TABLET ORAL at 23:23

## 2019-08-16 RX ADMIN — ACETAMINOPHEN 1000 MG: 500 TABLET ORAL at 19:46

## 2019-08-16 RX ADMIN — ENOXAPARIN SODIUM 40 MG: 40 INJECTION SUBCUTANEOUS at 09:50

## 2019-08-16 RX ADMIN — ACETAMINOPHEN 1000 MG: 500 TABLET ORAL at 01:44

## 2019-08-16 RX ADMIN — ALVIMOPAN 12 MG: 12 CAPSULE ORAL at 18:49

## 2019-08-16 RX ADMIN — HYDROMORPHONE HYDROCHLORIDE 2 MG: 2 TABLET ORAL at 18:49

## 2019-08-16 RX ADMIN — LISINOPRIL 10 MG: 10 TABLET ORAL at 19:46

## 2019-08-16 RX ADMIN — ALVIMOPAN 12 MG: 12 CAPSULE ORAL at 09:50

## 2019-08-16 NOTE — OP NOTES
[address]   OPERATIVE REPORT    Name:  Chris Dickey  MR#:  [id number]  :  1950  ACCOUNT #:  [de-identified]    DATE OF SERVICE:  08/15/2019    PREOPERATIVE DIAGNOSES:  1. History of diverticulitis. 2.  Sigmoid colon cancer. POSTOPERATIVE DIAGNOSES:  1. History of diverticulitis. 2.  Sigmoid colon cancer. PROCEDURE PERFORMED:  Hand-assisted laparoscopic sigmoid colon resection, mobilization of the splenic flexure, and coloproctostomy. SURGEON:  Maryjo Irving MD    ASSISTANTS:  Jamil Thorpe SA and Carla Pickard. \[facility] [place] [facility]\""trisha, [address] [place] [address]    ANESTHESIA:  General endotracheal.    SPECIMENS REMOVED:  1. Sigmoid colon and proximal rectum. 2.  Anastomotic donuts. TUBES AND DRAINS:  7-mm Victor Hugo-Cruz drain in the pelvis. ESTIMATED BLOOD LOSS:  100 mL. CRYSTALLOID:  900 mL. ALBUMIN:  500 mL. URINE OUTPUT:  245 mL. COMPLICATIONS:  None apparent. IMPLANTS:  None. INDICATIONS FOR PROCEDURE:  The patient is a 70-year-old female who has had multiple episodes of diverticulitis and who also recently underwent the colonoscopic excision of a malignant polyp. Her first episode of diverticulitis probably occurred approximately 14 years ago. That was diagnosed by a CT scan and treated with a combination of parenteral and then oral antibiotics. She has had subsequent episodes that she believes were also attacks of diverticulitis. She has probably had five of these attacks in the last 5 years. At the end of February of this year, she developed symptoms and was diagnosed with diverticulitis via a CT scan performed at [facility].  She was treated with 10 days of oral ciprofloxacin and metronidazole, and the symptoms mostly resolved. She eventually saw Dr. Pepper Bardales in consultation, and on 2019 she underwent a colonoscopy (which was her first).   The procedure revealed severe sigmoid diverticulosis, a flat 15 mm hepatic flexure polyp, and a pedunculated 15 mm polyp that was located in the sigmoid colon approximately 17 cm from the anal verge. Both polyps were excised, and both proved to have been tubular adenomas; however, the sigmoid colon polyp also contained adenocarcinoma that invaded the polyp head and that was classified as Haggitt level I (pT1). A CT scan of the abdomen and pelvis with oral and IV contrast was performed on 05/16/2019, and it was interpreted as revealing sigmoid diverticulosis with mild mural thickening but \"no definite evidence of diverticulitis. \"  There was also no evidence of metastatic disease. Another CT scan of the abdomen and pelvis with oral and IV contrast was performed on 07/15/2019, and it was interpreted as revealing sigmoid diverticulosis, hepatic steatosis, and a small gallstone. She was referred for surgical consultation regarding the recurrent episodes of diverticulitis as well as the colon cancer. We discussed the rationale for surgical resection of the sigmoid colon, which included both reduction of risk of future episodes of diverticulitis and also staging and possible treatment of the colon cancer. The risks were discussed in detail, and she agreed to proceed. In preparation for surgery, Dr. Deepali Kovacs performed another colonoscopy on her on 08/05/2019. That procedure was performed in order to evaluate the polypectomy site and to erica it. As it turned out, the site was not identifiable. Dr. Deepali Kovacs, therefore, tattooed the colon at 17 cm from the anal verge, which was the distance at which the malignant polyp had previously been located. OPERATIVE FINDINGS:  The distal sigmoid colon was thickened, and there were adhesions to the abdominal wall. There were some other adhesions that were apparently related to the patient's previous hysterectomy. There were no identifiable neoplasms. There was tattooing located in the distal sigmoid.     DESCRIPTION OF PROCEDURE:  After informed consent was obtained, the patient was taken to the operating room where standard monitoring devices were attached and adequate general endotracheal anesthesia was induced. She was then positioned in lithotomy with the lower extremities fitted with pneumatic compression stockings and supported by the padded hydraulic Jax stirrups. The perineum was prepped and draped in the usual sterile fashion, then Argenis Cooper MD performed cystoscopy and placement of bilateral ureteral catheters and a Chance catheter. The urologic procedure had been requested in order to facilitate intraoperative identification of the ureters, and once it had been completed, digital rectal examination was performed followed by the introduction of a 3-way Chance catheter with a 30 mL balloon. Via this catheter, the rectum and distal colon were copiously irrigated with several hundred milliliters of sterile water. The effluent ran clear and then a Betadine solution was also used to irrigate the rectum. The catheter was left in place until later in the case when its removal was required. The patient was repositioned into a low lithotomy with a gel pad placed beneath the pelvis and the right upper extremity tucked. An orogastric tube was inserted by the anesthetist.  2 g of cefotetan were administered parenterally. The patient had also undergone mechanical and antibiotic bowel preparation. The abdomen and perineum were prepped and draped in the usual sterile fashion. 0.5% bupivacaine with epinephrine was infiltrated subcutaneously prior to making skin incisions. The abdomen was entered through a periumbilical midline incision that was extended through the subcutaneous adipose tissue and linea alba using the electrocautery. The peritoneum was opened and the GelPort, which would function as both wound retractor and wound protector, was inserted. Next, a 12-mm port was inserted through the GelPort and pneumoperitoneum was achieved.   The 5-mm 30-degree laparoscope was inserted through this port and used to visually explore the abdomen. Under direct vision, a 5-mm trocar was inserted through the suprapubic midline incision. The laparoscope was then moved to that port and a 12-mm trocar was inserted in the right lower quadrant. The laparoscope was moved to the right lower quadrant site. Laparoscopic siobhan were deployed through the suprapubic port with the nondominant hand inserted through the GelPort, and lysis of adhesions to separate the small intestine from the anterior abdominal wall in the suprapubic region was performed. The siobhan were then removed and replaced by the articulating Enseal, which would be used for most of the intracorporeal dissection. This device was deployed through the right lower quadrant port, and the laparoscope was moved to the suprapubic site. Additional adhesions to the anterior abdominal wall were gradually taken down. The mesentery was opened at the level of the sacral promontory and dissection was performed superiorly to isolate and partially skeletonize the inferior mesenteric vessels. The Signia stapling device with a 45-mm tan cartridge was used to divide and ligate the inferior mesenteric artery. The staple line bled some and so multiple large hemoclips were applied. In this way, hemostasis was achieved. The left and right ureters were repeatedly identified and carefully avoided throughout the case. The dissection was carried inferiorly to mobilize the proximal rectum once the sigmoid colon was freed from its abdominal wall adhesions, allowing it to be unfolded. In this way, the rectum was exposed and the tattoo in the distal sigmoid was identifiable. It was decided to divide the rectum in its proximal portion. The adjacent mesentery was mostly divided using the Enseal; however, the superior rectal artery was divided using another firing of the 45-mm tan staple load of the Signia.   The rectum itself was divided using the Signia stapling device with a 60-mm purple cartridge. With the bowel divided, the mobilization of the left colon continued using the Enseal.  The splenic flexure was completely taken down. Doing so required  the omentum from the distal transverse colon and the proximal descending colon. Gradually, the colon was reflected medially and inferiorly. Great care was taken to avoid injuring the spleen. Once sufficient mobilization seemed to have been performed, the left colon was extracorporealized through the [address]. Inspection and palpation revealed a transition point between thickened colon wall and normal pliable colon wall. It was here that the colon would be divided. The mesentery adjacent to this portion was divided using a combination of the electrocautery, the Enseal, and sharp division between clamps with ligation of those sharply divided structures using 2-0 silk ties and 2-0 silk sutures. The colon was opened using the electrocautery on the specimen side then the retained contents, which were scant, were evacuated using suction. The EEA sizers were obtained, and the #25 and then the #28 could each be inserted into the colonic lumen. The #28 EEA stapling device was therefore brought onto the field. The anvil was dipped in Betadine and inserted into the colonic lumen. It was advanced proximal to the point prepared for division, then the Signia stapling device with the 60-mm purple cartridge was once again used to divide the bowel. The specimen was passed off to the back table and opened revealing no evidence of neoplasia. Returning to the abdomen after changing gown and gloves, the anvil shaft was brought out through a colotomy created intentionally using the electrocautery adjacent to the Signia staple line. A 2-0 Prolene pursestring suture was then placed and secured to anvil, and the inside of the anvil shaft was irrigated with Betadine.   The bowel was returned to the abdominal cavity. Pneumoperitoneum was restored. Visualization using the laparoscope revealed ample length for the creation of a tension-free coloproctostomy. The pelvis was irrigated and the irrigant was mostly evacuated. The assistant then went to the perineum. She dilated the anus and then inserted the lubricated #25 and then the #28 EEA sizers. The #28 EEA stapling device was then lubricated and inserted transanally. It was advanced carefully to the rectal staple line and then the spike was brought out through the approximate midportion just posterior to the staple line. The anvil shaft and spike were engaged. Inspection of the colon revealed no twisting. The stapler was slowly closed with care taken to avoid the incorporation of any extraneous tissues into the anastomosis. The stapler was then fully closed and fired. Upon removal of the stapler, inspection of the anastomotic donuts revealed both to be intact. Next, the rigid proctosigmoidoscope was lubricated and inserted transanally into the rectum. With the colon proximal to the anastomosis gently occluded, the proctosigmoidoscope was used to inflate the rectum with air. The rectum and colon became distended, and careful inspection for a number of minutes revealed no bubbling of air from the anastomosis. Some air did escape via the anus as the pressure was increased, but multiple leak tests revealed no bubbling. The bowel was deflated. The irrigant that had been instilled into the pelvis to improve visualization of bubbles was evacuated. Final palpation and inspection of the anastomosis revealed it to be under no tension and apparently well vascularized. Also, it was judged based on the leak test to be airtight. A 7-mm Victor Hugo-Cruz drain was obtained and placed in the pelvis, then brought out through a left lower quadrant stab incision and secured to the skin with a 3-0 nylon suture.   The 7-mm drain was selected because there were no 10-mm drains and this was the closest match. The abdomen was reexplored. Hemostasis was excellent. A small rent in the mesentery was identified, and it was decided that it should be repaired in order to reduce the chance of the formation of an internal hernia. The omentum was, therefore, extracorporealized through the GelPort and a running 3-0 Vicryl suture was used to close the rent. The trocars were removed. Two small sheets of Seprafilm were placed on the small intestine with none near the coloproctostomy. The omentum was then draped over the small intestine, and two more small sheets were placed anterior to the omentum. The GelPort was then removed. Per protocol, gowns and gloves were changed and the dedicated closing instrument set was opened. The patient was re-draped. The periumbilical midline fascial closure was performed using two running #1 PDS sutures. The subcutaneous portions of the three wounds were irrigated with saline. The subcutaneous adipose tissue in the periumbilical midline wound was reapproximated with 2-0 Vicryl sutures. Skin closure at all three wounds was performed using subcuticular 4-0 Monocryl sutures and Exofin. A drain dressing was applied. The orogastric tube and the ureteral catheters were removed. The Chance catheter was left in place. There were no apparent complications, and the patient appeared to have tolerated the procedure well. At its conclusion, she was extubated and transported in stable condition to the recovery room. All sponge, needle and instrument counts were correct.           Martinez Moreno MD      [id number]  D:  08/16/2019 2:47  T:  08/16/2019 3:01  JOB #:  [id number]  CC:  MD Libby Lan MD Jodie Gather, MD

## 2019-08-16 NOTE — PROGRESS NOTES
General Daily Progress Note    Admission Date: 8/15/2019  Hospital Day 2  Post-Op Day 1  Subjective:   No nausea. Pain control adequate. Has ambulateded in the halls. Objective:     Patient Vitals for the past 24 hrs:   BP Temp Pulse Resp SpO2 Height Weight   08/16/19 0737 147/74 99.4 °F (37.4 °C) 79 16 98 % -- --   08/16/19 0343 165/72 98.7 °F (37.1 °C) 89 16 97 % -- 60.5 kg (133 lb 4.8 oz)   08/15/19 2313 189/82 97.5 °F (36.4 °C) 86 16 98 % -- --   08/15/19 2226 189/84 98.8 °F (37.1 °C) 84 16 97 % -- --   08/15/19 2103 177/78 97.5 °F (36.4 °C) 78 14 95 % -- --   08/15/19 1929 149/74 98.3 °F (36.8 °C) 79 14 96 % -- --   08/15/19 1900 123/57 -- 76 14 94 % -- --   08/15/19 1845 125/57 -- 70 19 94 % -- --   08/15/19 1830 121/56 98.2 °F (36.8 °C) 72 20 93 % -- --   08/15/19 1815 117/53 -- 72 23 95 % -- --   08/15/19 1805 113/55 -- 64 23 94 % -- --   08/15/19 1800 106/51 -- (!) 59 23 94 % -- --   08/15/19 1755 104/50 -- 61 24 94 % -- --   08/15/19 1753 113/53 97.6 °F (36.4 °C) 64 22 94 % -- --   08/15/19 1751 113/53 -- 73 19 94 % -- --   08/15/19 1116 155/77 -- -- -- -- -- --   08/15/19 1113 -- -- 60 18 99 % 5' 5\" (1.651 m) 60.1 kg (132 lb 9 oz)   08/15/19 1112 -- 98.1 °F (36.7 °C) -- -- -- -- --     No intake/output data recorded. 08/14 1901 - 08/16 0700  In: 900 [I.V.:900]  Out: 2100 [Urine:1820; Drains:180]    Physical Examination:  General Appearance - Somewhat uncomfortable. Chest - Lungs clear to auscultation. Heart - Normal rate and regular rhythm. Abdomen - Appropriately tender. Incisions clean, dry, intact, and without abnormal erythema. RISHABH drainage serosanguinous.  - Chance catheter draining dilute urine. Extremities - No edema. No calf tenderness. Compression stockings in use.           Data Review   Recent Results (from the past 24 hour(s))   CBC WITH AUTOMATED DIFF    Collection Time: 08/15/19  6:08 PM   Result Value Ref Range    WBC 8.4 3.6 - 11.0 K/uL    RBC 4.33 3.80 - 5.20 M/uL    HGB 12.3 11.5 - 16.0 g/dL    HCT 38.2 35.0 - 47.0 %    MCV 88.2 80.0 - 99.0 FL    MCH 28.4 26.0 - 34.0 PG    MCHC 32.2 30.0 - 36.5 g/dL    RDW 14.1 11.5 - 14.5 %    PLATELET 341 480 - 519 K/uL    MPV 10.8 8.9 - 12.9 FL    NRBC 0.0 0  WBC    ABSOLUTE NRBC 0.00 0.00 - 0.01 K/uL    NEUTROPHILS 82 (H) 32 - 75 %    BAND NEUTROPHILS 7 (H) 0 - 6 %    LYMPHOCYTES 7 (L) 12 - 49 %    MONOCYTES 4 (L) 5 - 13 %    EOSINOPHILS 0 0 - 7 %    BASOPHILS 0 0 - 1 %    IMMATURE GRANULOCYTES 0 %    ABS. NEUTROPHILS 7.5 1.8 - 8.0 K/UL    ABS. LYMPHOCYTES 0.6 (L) 0.8 - 3.5 K/UL    ABS. MONOCYTES 0.3 0.0 - 1.0 K/UL    ABS. EOSINOPHILS 0.0 0.0 - 0.4 K/UL    ABS. BASOPHILS 0.0 0.0 - 0.1 K/UL    ABS. IMM.  GRANS. 0.0 K/UL    DF AUTOMATED      RBC COMMENTS NORMOCYTIC, NORMOCHROMIC     METABOLIC PANEL, BASIC    Collection Time: 08/15/19  6:08 PM   Result Value Ref Range    Sodium 141 136 - 145 mmol/L    Potassium 3.6 3.5 - 5.1 mmol/L    Chloride 108 97 - 108 mmol/L    CO2 23 21 - 32 mmol/L    Anion gap 10 5 - 15 mmol/L    Glucose 155 (H) 65 - 100 mg/dL    BUN 9 6 - 20 MG/DL    Creatinine 0.93 0.55 - 1.02 MG/DL    BUN/Creatinine ratio 10 (L) 12 - 20      GFR est AA >60 >60 ml/min/1.73m2    GFR est non-AA 60 (L) >60 ml/min/1.73m2    Calcium 8.3 (L) 8.5 - 10.1 MG/DL   MAGNESIUM    Collection Time: 08/15/19  6:08 PM   Result Value Ref Range    Magnesium 2.4 1.6 - 2.4 mg/dL   PHOSPHORUS    Collection Time: 08/15/19  6:08 PM   Result Value Ref Range    Phosphorus 2.9 2.6 - 4.7 MG/DL   CBC W/O DIFF    Collection Time: 08/16/19  4:07 AM   Result Value Ref Range    WBC 8.6 3.6 - 11.0 K/uL    RBC 4.72 3.80 - 5.20 M/uL    HGB 13.5 11.5 - 16.0 g/dL    HCT 41.7 35.0 - 47.0 %    MCV 88.3 80.0 - 99.0 FL    MCH 28.6 26.0 - 34.0 PG    MCHC 32.4 30.0 - 36.5 g/dL    RDW 14.2 11.5 - 14.5 %    PLATELET 773 139 - 056 K/uL    MPV 10.8 8.9 - 12.9 FL    NRBC 0.0 0  WBC    ABSOLUTE NRBC 0.00 0.00 - 8.89 K/uL   METABOLIC PANEL, BASIC    Collection Time: 08/16/19 4: 07 AM   Result Value Ref Range    Sodium 136 136 - 145 mmol/L    Potassium 4.1 3.5 - 5.1 mmol/L    Chloride 102 97 - 108 mmol/L    CO2 24 21 - 32 mmol/L    Anion gap 10 5 - 15 mmol/L    Glucose 134 (H) 65 - 100 mg/dL    BUN 7 6 - 20 MG/DL    Creatinine 0.87 0.55 - 1.02 MG/DL    BUN/Creatinine ratio 8 (L) 12 - 20      GFR est AA >60 >60 ml/min/1.73m2    GFR est non-AA >60 >60 ml/min/1.73m2    Calcium 9.4 8.5 - 10.1 MG/DL   MAGNESIUM    Collection Time: 08/16/19  4:07 AM   Result Value Ref Range    Magnesium 2.2 1.6 - 2.4 mg/dL   PHOSPHORUS    Collection Time: 08/16/19  4:07 AM   Result Value Ref Range    Phosphorus 2.8 2.6 - 4.7 MG/DL           Assessment:     Principal Problem:    History of diverticulitis of colon (8/15/2019)    Active Problems:    Colon cancer ([facility] [address] (8/15/2019)      Hypertension (8/15/2019)        1 Day Post-Op s/p hand-assisted laparoscopic sigmoid colon resection, mobilization of the splenic flexure, and coloproctostomy. Hemodynamically stable. Hemoglobin stable. Good urine output. Creatinine normal.    Low grade fever most likely secondary to atelectasis and should respond to use of incentive spirometer. Plan:   Begin low fiber diet with caution. Ambulate. Incentive spirometer.

## 2019-08-16 NOTE — PROGRESS NOTES
PHYSICAL THERAPY EVALUATION/DISCHARGE  Patient: César Staley (86 y.o. female)  Date: 8/16/2019  Primary Diagnosis: History of diverticulitis of colon [Z87.19]  Procedure(s) (LRB):  HAND ASSISTED LAPAROSCOPIC  SIGMOID RESECTION WITH MOBILIZATION OF SPLENIC FLECTURE AND COLOPROCTOSCOPY, CYSTOSCOPY PLACEMENT BILATERAL URETERAL CATHETERS  ( E. [facility]. A. S. )    (LATEX ALLERGY) (Bilateral)  CYSTOSCOPY WITH BILATERAL  URETERAL STENTS (Bilateral) 1 Day Post-Op   Precautions:          ASSESSMENT  Based on the objective data described below, the patient presents with functional mobility independence and tolerance close to baseline level with limitations primarily only due to pain and general fatigue expected post-operatively. Patient completed gait pushing her IV pole but did not require support for stability, exhibited very fast anish, encoaurged to slow down due to lines and unfamiliar environment. Once patient has IV and/or Chance removed, she can be up ad adwoa, but for now asked her to call for supervision due to excessive lines. Otherwise no therapy needs at this time, will complete order. Functional Outcome Measure: The patient scored 9sec on the TUG outcome measure which is indicative of very low fall risk. Other factors to consider for discharge: lives with minor and adult children, very active PTA without use of any DME     Further skilled acute physical therapy is not indicated at this time. PLAN :  Recommendation for discharge: (in order for the patient to meet his/her long term goals)  No skilled physical therapy/ follow up rehabilitation needs identified at this time. This discharge recommendation:  Has been made in collaboration with the attending provider and/or case management    Equipment recommendations for successful discharge: none       SUBJECTIVE:   Patient stated I've already been walking today, I don't have any problems.     OBJECTIVE DATA SUMMARY:   HISTORY:    Past Medical History: Diagnosis Date    Asthma     Cholelithiasis     Incidental finding on CT scan.  Colon cancer ([address] 04/26/2019    Diverticulosis of colon     History of broken nose 2008    History of colonic polyps     History of diverticulitis of colon     [place] (hard of hearing)     Hypertension     Ill-defined condition 2008    FELL FROM A LADDER , BROKEN LEFT HAND     RBBB 8/7/2019    Vaginal delivery     Four times. Four episiotomies. Past Surgical History:   Procedure Laterality Date    COLONOSCOPY N/A 4/26/2019    COLONOSCOPY performed by Frances Saini MD at [place] [address]. Left 8/5/2019    COLONOSCOPY (LATEX ALLERGY) performed by Frances [facility] at [facility] ENDOSCOPY    HX GI      COLONOSCOPY     HX HEENT  2018    [address] (PLASTIC SURGERY)     HX HEENT  2008    BROKEN NOSE     HX OTHER SURGICAL      HX DANIEL AND BSO  1996    Performed for treatment of a uterine fibroid and excessive bleeding. Prior level of function: indep without use of DME, lives with adult and minor children  Personal factors and/or comorbidities impacting plan of care:     Home Situation  Home Environment: Private residence  # Steps to Enter: 7  One/Two Story Residence: Two story  # of Interior Steps: 14  Living Alone: No  Support Systems: Child(angel)(Has adult and  minor children)  Patient Expects to be Discharged to[de-identified] Private residence    EXAMINATION/PRESENTATION/DECISION MAKING:   Critical Behavior:  Neurologic State: Drowsy  Orientation Level: Oriented X4          Range Of Motion:  AROM: Within functional limits                       Strength:    Strength:  Within functional limits                    Tone & Sensation:   Tone: Normal              Sensation: Intact               Coordination:  Coordination: Within functional limits  Vision:      Functional Mobility:  Bed Mobility:  Rolling: Independent  Supine to Sit: Independent  Sit to Supine: Independent     Transfers:  Sit to Stand: Independent  Stand to Sit: Independent                       Balance:      Ambulation/Gait Training:  Distance (ft): 450 Feet (ft)  Assistive Device: Gait belt(IV pole)  Ambulation - Level of Assistance: Supervision(only needed for lines and drains)                       Speed/Eleln: Accelerated                            Functional Measure:  Timed up and go:    Timed Get Up And Go Test: 9       < than 10 seconds=Normal  Greater then 13.5 seconds (in elderly)=Increased fall risk   Deepa Gomez [place] M. Predicting the probability for falls in community dwelling older adults using the Timed Up and Go Test. Phys Ther. 2000;80:896-903. Physical Therapy Evaluation Charge Determination   History Examination Presentation Decision-Making   MEDIUM  Complexity : 1-2 comorbidities / personal factors will impact the outcome/ POC  LOW Complexity : 1-2 Standardized tests and measures addressing body structure, function, activity limitation and / or participation in recreation  MEDIUM Complexity : Evolving with changing characteristics  Other outcome measures tug 9 sec  LOW       Based on the above components, the patient evaluation is determined to be of the following complexity level: LOW     Pain Rating:  3/10 in abdomen    Activity Tolerance:   Good  Please refer to the flowsheet for vital signs taken during this treatment. After treatment patient left in no apparent distress:   Sitting in chair and Call bell within reach    COMMUNICATION/EDUCATION:   The patients plan of care was discussed with: Registered Nurse.     Fall prevention education was provided and the patient/caregiver indicated understanding    Thank you for this referral.  Cassy Milner, PT   Time Calculation: 20 mins

## 2019-08-16 NOTE — PROGRESS NOTES
Physical Therapy:    Consult received, chart reviewed and RN consulted. Patient cleared to participate but Elias Mixon declined at this time 2/2 just getting back to bed after walking with nursing staff and sitting up in the chair for a couple of hours. Will f/u later today as able and appropriate.     Domenico Wellington, MS, PT

## 2019-08-16 NOTE — PROGRESS NOTES
Transitions of Care plan:  -low fiber diet  -ambulate with therapy  -incentive spirometer      Reason for Admission:   Sigmoid colon resection                   RRAT Score:   5                  Plan for utilizing home health:      TBD                    Current Advanced Directive/Advance Care Plan: not on file                         Transition of Care Plan:    Chart reviewed for transitions of care, discussed patient during rounds. Patient has been evaluated by PT and has no home health needs at this time. CM will be available if needed at at later date. Care Management Interventions  PCP Verified by CM: Yes  Palliative Care Criteria Met (RRAT>21 & CHF Dx)?: No  MyChart Signup: No  Discharge Durable Medical Equipment: No  Health Maintenance Reviewed: Yes  Physical Therapy Consult: Yes  Occupational Therapy Consult: No  Speech Therapy Consult: No  Confirm Follow Up Transport: Family  Freedom of Choice Offered: Yes   Resource Information Provided?: No  Discharge Location  Discharge Placement: Unable to determine at this time    Veronica Miranda

## 2019-08-16 NOTE — PROGRESS NOTES
Report received from PACU. Patient arrived to floor at 7:30p.m. during shift change. RN assessed vitals and got patient comfortable, then reported off to on-coming nurse, Reena, who will complete assessment and assume care hereafter. Bedside shift change report given to Kiley Fall RN (oncoming nurse) by Kam Porter RN (offgoing nurse). Report included the following information SBAR and Kardex.

## 2019-08-16 NOTE — PROGRESS NOTES
NUTRITION education brief     Pt visited for low fiber ed x 2 attempts. At 1st visit reporting pain and requesting writer to return later. Ed attempted at 2nd visit with minimal interest from pt -  \"I just want to get up and do my 3rd walk and get back to bed and have no one bother me. The doctor and the RN talked to me about my diet. \" Marie Gavin left at bedside. Please re-consult if pt interested in diet ed at later time.      Loan Sandoval RD

## 2019-08-16 NOTE — PROGRESS NOTES
Bedside and Verbal shift change report given to Renetta amos RN (oncoming nurse) by Doron Nielsen RN (offgoing nurse). Report included the following information SBAR.

## 2019-08-17 LAB
ANION GAP SERPL CALC-SCNC: 3 MMOL/L (ref 5–15)
BASOPHILS # BLD: 0 K/UL (ref 0–0.1)
BASOPHILS NFR BLD: 0 % (ref 0–1)
BUN SERPL-MCNC: 9 MG/DL (ref 6–20)
BUN/CREAT SERPL: 12 (ref 12–20)
CALCIUM SERPL-MCNC: 9.2 MG/DL (ref 8.5–10.1)
CHLORIDE SERPL-SCNC: 109 MMOL/L (ref 97–108)
CO2 SERPL-SCNC: 28 MMOL/L (ref 21–32)
CREAT SERPL-MCNC: 0.73 MG/DL (ref 0.55–1.02)
DIFFERENTIAL METHOD BLD: ABNORMAL
EOSINOPHIL # BLD: 0 K/UL (ref 0–0.4)
EOSINOPHIL NFR BLD: 0 % (ref 0–7)
ERYTHROCYTE [DISTWIDTH] IN BLOOD BY AUTOMATED COUNT: 14.6 % (ref 11.5–14.5)
GLUCOSE SERPL-MCNC: 122 MG/DL (ref 65–100)
HCT VFR BLD AUTO: 38.2 % (ref 35–47)
HGB BLD-MCNC: 12.3 G/DL (ref 11.5–16)
IMM GRANULOCYTES # BLD AUTO: 0 K/UL (ref 0–0.04)
IMM GRANULOCYTES NFR BLD AUTO: 0 % (ref 0–0.5)
LYMPHOCYTES # BLD: 1 K/UL (ref 0.8–3.5)
LYMPHOCYTES NFR BLD: 14 % (ref 12–49)
MAGNESIUM SERPL-MCNC: 2.2 MG/DL (ref 1.6–2.4)
MCH RBC QN AUTO: 28.3 PG (ref 26–34)
MCHC RBC AUTO-ENTMCNC: 32.2 G/DL (ref 30–36.5)
MCV RBC AUTO: 88 FL (ref 80–99)
MONOCYTES # BLD: 0.5 K/UL (ref 0–1)
MONOCYTES NFR BLD: 7 % (ref 5–13)
NEUTS SEG # BLD: 5.5 K/UL (ref 1.8–8)
NEUTS SEG NFR BLD: 79 % (ref 32–75)
NRBC # BLD: 0 K/UL (ref 0–0.01)
NRBC BLD-RTO: 0 PER 100 WBC
PHOSPHATE SERPL-MCNC: 1.9 MG/DL (ref 2.6–4.7)
PLATELET # BLD AUTO: 193 K/UL (ref 150–400)
PMV BLD AUTO: 10.9 FL (ref 8.9–12.9)
POTASSIUM SERPL-SCNC: 3.6 MMOL/L (ref 3.5–5.1)
RBC # BLD AUTO: 4.34 M/UL (ref 3.8–5.2)
SODIUM SERPL-SCNC: 140 MMOL/L (ref 136–145)
WBC # BLD AUTO: 7 K/UL (ref 3.6–11)

## 2019-08-17 PROCEDURE — 74011250636 HC RX REV CODE- 250/636: Performed by: COLON & RECTAL SURGERY

## 2019-08-17 PROCEDURE — 80048 BASIC METABOLIC PNL TOTAL CA: CPT

## 2019-08-17 PROCEDURE — 65270000029 HC RM PRIVATE

## 2019-08-17 PROCEDURE — 83735 ASSAY OF MAGNESIUM: CPT

## 2019-08-17 PROCEDURE — 84100 ASSAY OF PHOSPHORUS: CPT

## 2019-08-17 PROCEDURE — 36415 COLL VENOUS BLD VENIPUNCTURE: CPT

## 2019-08-17 PROCEDURE — 85025 COMPLETE CBC W/AUTO DIFF WBC: CPT

## 2019-08-17 PROCEDURE — 74011250637 HC RX REV CODE- 250/637: Performed by: COLON & RECTAL SURGERY

## 2019-08-17 RX ORDER — SODIUM CHLORIDE 0.9 % (FLUSH) 0.9 %
SYRINGE (ML) INJECTION
Status: COMPLETED
Start: 2019-08-17 | End: 2019-08-17

## 2019-08-17 RX ORDER — KETOROLAC TROMETHAMINE 30 MG/ML
15 INJECTION, SOLUTION INTRAMUSCULAR; INTRAVENOUS EVERY 6 HOURS
Status: DISCONTINUED | OUTPATIENT
Start: 2019-08-17 | End: 2019-08-18

## 2019-08-17 RX ORDER — DICYCLOMINE HYDROCHLORIDE 10 MG/1
10 CAPSULE ORAL
Status: DISCONTINUED | OUTPATIENT
Start: 2019-08-17 | End: 2019-08-19 | Stop reason: HOSPADM

## 2019-08-17 RX ADMIN — ACETAMINOPHEN 1000 MG: 500 TABLET ORAL at 07:07

## 2019-08-17 RX ADMIN — KETOROLAC TROMETHAMINE 15 MG: 30 INJECTION, SOLUTION INTRAMUSCULAR at 15:22

## 2019-08-17 RX ADMIN — CLONIDINE HYDROCHLORIDE 0.1 MG: 0.1 TABLET ORAL at 15:22

## 2019-08-17 RX ADMIN — ALVIMOPAN 12 MG: 12 CAPSULE ORAL at 08:41

## 2019-08-17 RX ADMIN — ACETAMINOPHEN 1000 MG: 500 TABLET ORAL at 01:58

## 2019-08-17 RX ADMIN — ENOXAPARIN SODIUM 40 MG: 40 INJECTION SUBCUTANEOUS at 09:42

## 2019-08-17 RX ADMIN — Medication 10 ML: at 17:28

## 2019-08-17 RX ADMIN — HYDROMORPHONE HYDROCHLORIDE 2 MG: 2 TABLET ORAL at 14:23

## 2019-08-17 RX ADMIN — DIBASIC SODIUM PHOSPHATE, MONOBASIC POTASSIUM PHOSPHATE AND MONOBASIC SODIUM PHOSPHATE 1 TABLET: 852; 155; 130 TABLET ORAL at 08:41

## 2019-08-17 RX ADMIN — KETOROLAC TROMETHAMINE 15 MG: 30 INJECTION, SOLUTION INTRAMUSCULAR at 09:43

## 2019-08-17 RX ADMIN — Medication 10 ML: at 23:02

## 2019-08-17 RX ADMIN — DIBASIC SODIUM PHOSPHATE, MONOBASIC POTASSIUM PHOSPHATE AND MONOBASIC SODIUM PHOSPHATE 1 TABLET: 852; 155; 130 TABLET ORAL at 17:27

## 2019-08-17 RX ADMIN — ACETAMINOPHEN 1000 MG: 500 TABLET ORAL at 20:56

## 2019-08-17 RX ADMIN — DICYCLOMINE HYDROCHLORIDE 10 MG: 10 CAPSULE ORAL at 23:01

## 2019-08-17 RX ADMIN — LISINOPRIL 10 MG: 10 TABLET ORAL at 08:41

## 2019-08-17 RX ADMIN — HYDROMORPHONE HYDROCHLORIDE 2 MG: 2 TABLET ORAL at 21:51

## 2019-08-17 RX ADMIN — KETOROLAC TROMETHAMINE 15 MG: 30 INJECTION, SOLUTION INTRAMUSCULAR at 23:00

## 2019-08-17 RX ADMIN — ALVIMOPAN 12 MG: 12 CAPSULE ORAL at 17:27

## 2019-08-17 RX ADMIN — ACETAMINOPHEN 1000 MG: 500 TABLET ORAL at 14:23

## 2019-08-17 NOTE — PROGRESS NOTES
Bedside shift change report given to [facility] (oncoming nurse) by Ade Toth (offgoing nurse). Report included the following information SBAR.

## 2019-08-17 NOTE — PROGRESS NOTES
Bedside shift change report given to Mary Ann Donis (oncoming nurse) by Lottie GORDON (offgoing nurse). Report included the following information SBAR and Kardex.

## 2019-08-17 NOTE — PROGRESS NOTES
General Daily Progress Note    Admission Date: 8/15/2019  Hospital Day 3  Post-Op Day 2  Subjective:   No nausea. Passed flatus. Eating solid food seems to trigger severe abdominal cramps. Drinking liquids does not. Voiding well. Ambulating well. Objective:     Patient Vitals for the past 24 hrs:   BP Temp Pulse Resp SpO2 Weight   08/17/19 0608 -- -- -- -- -- 58.8 kg (129 lb 11.2 oz)   08/17/19 0352 111/64 98.1 °F (36.7 °C) 74 18 95 % --   08/17/19 0014 121/67 -- 79 -- -- --   08/16/19 2343 167/79 99 °F (37.2 °C) 78 18 96 % --   08/16/19 2323 167/79 -- -- -- -- --   08/16/19 2026 170/73 98.9 °F (37.2 °C) 81 16 95 % --   08/16/19 1839 (!) 173/98 99.5 °F (37.5 °C) 76 16 95 % --   08/16/19 1559 184/80 99.4 °F (37.4 °C) 78 16 95 % --   08/16/19 1131 169/70 97.7 °F (36.5 °C) 77 16 96 % --     No intake/output data recorded. 08/15 1901 - 08/17 0700  In: 200 [P.O.:200]  Out: 5200 [Urine:4950; Drains:250]    Physical Examination:  General Appearance - Having painful spasms. Abdomen - Appropriately tender. Non-distended. Incisions clean, dry, intact, and with ecchymosis but no abnormal erythema. RISHABH drainage serosanguinous. Extremities - No edema. No calf tenderness. Compression stockings in use. Data Review   Recent Results (from the past 24 hour(s))   CBC WITH AUTOMATED DIFF    Collection Time: 08/17/19  2:11 AM   Result Value Ref Range    WBC 7.0 3.6 - 11.0 K/uL    RBC 4.34 3.80 - 5.20 M/uL    HGB 12.3 11.5 - 16.0 g/dL    HCT 38.2 35.0 - 47.0 %    MCV 88.0 80.0 - 99.0 FL    MCH 28.3 26.0 - 34.0 PG    MCHC 32.2 30.0 - 36.5 g/dL    RDW 14.6 (H) 11.5 - 14.5 %    PLATELET 907 017 - 374 K/uL    MPV 10.9 8.9 - 12.9 FL    NRBC 0.0 0  WBC    ABSOLUTE NRBC 0.00 0.00 - 0.01 K/uL    NEUTROPHILS 79 (H) 32 - 75 %    LYMPHOCYTES 14 12 - 49 %    MONOCYTES 7 5 - 13 %    EOSINOPHILS 0 0 - 7 %    BASOPHILS 0 0 - 1 %    IMMATURE GRANULOCYTES 0 0.0 - 0.5 %    ABS. NEUTROPHILS 5.5 1.8 - 8.0 K/UL    ABS. LYMPHOCYTES 1.0 0.8 - 3.5 K/UL    ABS. MONOCYTES 0.5 0.0 - 1.0 K/UL    ABS. EOSINOPHILS 0.0 0.0 - 0.4 K/UL    ABS. BASOPHILS 0.0 0.0 - 0.1 K/UL    ABS. IMM. GRANS. 0.0 0.00 - 0.04 K/UL    DF AUTOMATED     METABOLIC PANEL, BASIC    Collection Time: 08/17/19  2:11 AM   Result Value Ref Range    Sodium 140 136 - 145 mmol/L    Potassium 3.6 3.5 - 5.1 mmol/L    Chloride 109 (H) 97 - 108 mmol/L    CO2 28 21 - 32 mmol/L    Anion gap 3 (L) 5 - 15 mmol/L    Glucose 122 (H) 65 - 100 mg/dL    BUN 9 6 - 20 MG/DL    Creatinine 0.73 0.55 - 1.02 MG/DL    BUN/Creatinine ratio 12 12 - 20      GFR est AA >60 >60 ml/min/1.73m2    GFR est non-AA >60 >60 ml/min/1.73m2    Calcium 9.2 8.5 - 10.1 MG/DL   MAGNESIUM    Collection Time: 08/17/19  2:11 AM   Result Value Ref Range    Magnesium 2.2 1.6 - 2.4 mg/dL   PHOSPHORUS    Collection Time: 08/17/19  2:11 AM   Result Value Ref Range    Phosphorus 1.9 (L) 2.6 - 4.7 MG/DL           Assessment:     Principal Problem:    History of diverticulitis of colon (8/15/2019)    Active Problems:    Colon cancer ([facility] [address] (8/15/2019)      Hypertension (8/15/2019)        2 Days Post-Op s/p hand-assisted laparoscopic sigmoid colon resection, mobilization of the splenic flexure, and coloproctostomy.     Hemodynamically stable. Hemoglobin stable.     Good urine output. Creatinine normal.    No evidence of infection. Awaiting return of GI function. Intestinal spasms might improve with Bentyl. Hypophosphatemic. Plan:   Continue low fiber diet. Bentyl as needed for intestinal spasms. Replace phosphorus. Ambulate. Incentive spirometer.

## 2019-08-18 LAB
ANION GAP SERPL CALC-SCNC: 8 MMOL/L (ref 5–15)
BASOPHILS # BLD: 0 K/UL (ref 0–0.1)
BASOPHILS NFR BLD: 0 % (ref 0–1)
BUN SERPL-MCNC: 14 MG/DL (ref 6–20)
BUN/CREAT SERPL: 21 (ref 12–20)
CALCIUM SERPL-MCNC: 9.3 MG/DL (ref 8.5–10.1)
CHLORIDE SERPL-SCNC: 106 MMOL/L (ref 97–108)
CO2 SERPL-SCNC: 28 MMOL/L (ref 21–32)
CREAT SERPL-MCNC: 0.68 MG/DL (ref 0.55–1.02)
DIFFERENTIAL METHOD BLD: ABNORMAL
EOSINOPHIL # BLD: 0.1 K/UL (ref 0–0.4)
EOSINOPHIL NFR BLD: 2 % (ref 0–7)
ERYTHROCYTE [DISTWIDTH] IN BLOOD BY AUTOMATED COUNT: 14.2 % (ref 11.5–14.5)
GLUCOSE SERPL-MCNC: 104 MG/DL (ref 65–100)
HCT VFR BLD AUTO: 36.4 % (ref 35–47)
HGB BLD-MCNC: 11.8 G/DL (ref 11.5–16)
IMM GRANULOCYTES # BLD AUTO: 0 K/UL (ref 0–0.04)
IMM GRANULOCYTES NFR BLD AUTO: 1 % (ref 0–0.5)
LYMPHOCYTES # BLD: 1.2 K/UL (ref 0.8–3.5)
LYMPHOCYTES NFR BLD: 25 % (ref 12–49)
MCH RBC QN AUTO: 28.4 PG (ref 26–34)
MCHC RBC AUTO-ENTMCNC: 32.4 G/DL (ref 30–36.5)
MCV RBC AUTO: 87.5 FL (ref 80–99)
MONOCYTES # BLD: 0.4 K/UL (ref 0–1)
MONOCYTES NFR BLD: 8 % (ref 5–13)
NEUTS SEG # BLD: 3.2 K/UL (ref 1.8–8)
NEUTS SEG NFR BLD: 64 % (ref 32–75)
NRBC # BLD: 0 K/UL (ref 0–0.01)
NRBC BLD-RTO: 0 PER 100 WBC
PHOSPHATE SERPL-MCNC: 3.5 MG/DL (ref 2.6–4.7)
PLATELET # BLD AUTO: 175 K/UL (ref 150–400)
PMV BLD AUTO: 10.5 FL (ref 8.9–12.9)
POTASSIUM SERPL-SCNC: 3.6 MMOL/L (ref 3.5–5.1)
RBC # BLD AUTO: 4.16 M/UL (ref 3.8–5.2)
SODIUM SERPL-SCNC: 142 MMOL/L (ref 136–145)
WBC # BLD AUTO: 5 K/UL (ref 3.6–11)

## 2019-08-18 PROCEDURE — 74011250636 HC RX REV CODE- 250/636: Performed by: COLON & RECTAL SURGERY

## 2019-08-18 PROCEDURE — 36415 COLL VENOUS BLD VENIPUNCTURE: CPT

## 2019-08-18 PROCEDURE — 65270000029 HC RM PRIVATE

## 2019-08-18 PROCEDURE — 85025 COMPLETE CBC W/AUTO DIFF WBC: CPT

## 2019-08-18 PROCEDURE — 80048 BASIC METABOLIC PNL TOTAL CA: CPT

## 2019-08-18 PROCEDURE — 84100 ASSAY OF PHOSPHORUS: CPT

## 2019-08-18 PROCEDURE — 74011250637 HC RX REV CODE- 250/637: Performed by: COLON & RECTAL SURGERY

## 2019-08-18 RX ORDER — KETOROLAC TROMETHAMINE 30 MG/ML
15 INJECTION, SOLUTION INTRAMUSCULAR; INTRAVENOUS EVERY 6 HOURS
Status: COMPLETED | OUTPATIENT
Start: 2019-08-18 | End: 2019-08-18

## 2019-08-18 RX ADMIN — ACETAMINOPHEN 1000 MG: 500 TABLET ORAL at 07:25

## 2019-08-18 RX ADMIN — KETOROLAC TROMETHAMINE 15 MG: 30 INJECTION, SOLUTION INTRAMUSCULAR at 06:40

## 2019-08-18 RX ADMIN — ACETAMINOPHEN 1000 MG: 500 TABLET ORAL at 13:09

## 2019-08-18 RX ADMIN — ENOXAPARIN SODIUM 40 MG: 40 INJECTION SUBCUTANEOUS at 09:28

## 2019-08-18 RX ADMIN — KETOROLAC TROMETHAMINE 15 MG: 30 INJECTION, SOLUTION INTRAMUSCULAR at 17:27

## 2019-08-18 RX ADMIN — LISINOPRIL 10 MG: 10 TABLET ORAL at 09:28

## 2019-08-18 RX ADMIN — ACETAMINOPHEN 1000 MG: 500 TABLET ORAL at 02:14

## 2019-08-18 RX ADMIN — KETOROLAC TROMETHAMINE 15 MG: 30 INJECTION, SOLUTION INTRAMUSCULAR at 23:44

## 2019-08-18 RX ADMIN — ACETAMINOPHEN 1000 MG: 500 TABLET ORAL at 19:09

## 2019-08-18 RX ADMIN — HYDROMORPHONE HYDROCHLORIDE 2 MG: 2 TABLET ORAL at 09:31

## 2019-08-18 RX ADMIN — KETOROLAC TROMETHAMINE 15 MG: 30 INJECTION, SOLUTION INTRAMUSCULAR at 13:08

## 2019-08-18 NOTE — PROGRESS NOTES
Physical Therapy  Orders received to assess on steps. Patient was able to ambulate Independently to stair well and managed full flight modified independent with light hold to rail. She is safe to manage well at home and will complete orders.   Brittany Rock, PT

## 2019-08-18 NOTE — PROGRESS NOTES
Bedside shift change report given to Fred Kirkpatrick (oncoming nurse) by Grace Kim (offgoing nurse). Report included the following information SBAR.

## 2019-08-18 NOTE — PROGRESS NOTES
General Daily Progress Note    Admission Date: 8/15/2019  Hospital Day 4  Post-Op Day 3  Subjective:   Still having some pain consistent with intestinal spasms with intake of most solid food, but dicyclomine seemed to help. Overall pain control is adequate with Tylenol, Toradol, and oral Dilaudid. Passing flatus and watery stool. Ambulating well, but not sure she is strong enough to climb the stairs that she would need to at home. Objective:     Patient Vitals for the past 24 hrs:   BP Temp Pulse Resp SpO2 Weight   08/18/19 0821 119/63 97.7 °F (36.5 °C) 80 18 98 % --   08/18/19 0726 -- -- -- -- -- 58.4 kg (128 lb 12 oz)   08/18/19 0456 120/63 98.3 °F (36.8 °C) 69 18 94 % --   08/18/19 0056 106/44 99.2 °F (37.3 °C) 76 18 93 % --   08/17/19 2026 107/62 98.3 °F (36.8 °C) 69 18 95 % --   08/17/19 1509 171/87 97.6 °F (36.4 °C) 77 18 98 % --   08/17/19 0905 146/87 98.6 °F (37 °C) 75 18 97 % --     No intake/output data recorded. 08/16 1901 - 08/18 0700  In: 936.7 [P.O.:600; I.V.:336.7]  Out: 2230 [Urine:2150; Drains:80]      Physical Examination:  General Appearance - No distress. Lungs - Clear to auscultation. Heart - Normal rate and regular rhythm. Abdomen - Appropriately tender.  Non-distended. Incisions clean, dry, intact, and with ecchymosis but no abnormal erythema.  RISHABH drainage serosanguinous. Extremities - Compressions stockings in use.           Data Review   Recent Results (from the past 24 hour(s))   CBC WITH AUTOMATED DIFF    Collection Time: 08/18/19  3:39 AM   Result Value Ref Range    WBC 5.0 3.6 - 11.0 K/uL    RBC 4.16 3.80 - 5.20 M/uL    HGB 11.8 11.5 - 16.0 g/dL    HCT 36.4 35.0 - 47.0 %    MCV 87.5 80.0 - 99.0 FL    MCH 28.4 26.0 - 34.0 PG    MCHC 32.4 30.0 - 36.5 g/dL    RDW 14.2 11.5 - 14.5 %    PLATELET 922 081 - 700 K/uL    MPV 10.5 8.9 - 12.9 FL    NRBC 0.0 0  WBC    ABSOLUTE NRBC 0.00 0.00 - 0.01 K/uL    NEUTROPHILS 64 32 - 75 %    LYMPHOCYTES 25 12 - 49 %    MONOCYTES 8 5 - 13 %    EOSINOPHILS 2 0 - 7 %    BASOPHILS 0 0 - 1 %    IMMATURE GRANULOCYTES 1 (H) 0.0 - 0.5 %    ABS. NEUTROPHILS 3.2 1.8 - 8.0 K/UL    ABS. LYMPHOCYTES 1.2 0.8 - 3.5 K/UL    ABS. MONOCYTES 0.4 0.0 - 1.0 K/UL    ABS. EOSINOPHILS 0.1 0.0 - 0.4 K/UL    ABS. BASOPHILS 0.0 0.0 - 0.1 K/UL    ABS. IMM. GRANS. 0.0 0.00 - 0.04 K/UL    DF AUTOMATED     PHOSPHORUS    Collection Time: 08/18/19  3:39 AM   Result Value Ref Range    Phosphorus 3.5 2.6 - 4.7 MG/DL   METABOLIC PANEL, BASIC    Collection Time: 08/18/19  3:39 AM   Result Value Ref Range    Sodium 142 136 - 145 mmol/L    Potassium 3.6 3.5 - 5.1 mmol/L    Chloride 106 97 - 108 mmol/L    CO2 28 21 - 32 mmol/L    Anion gap 8 5 - 15 mmol/L    Glucose 104 (H) 65 - 100 mg/dL    BUN 14 6 - 20 MG/DL    Creatinine 0.68 0.55 - 1.02 MG/DL    BUN/Creatinine ratio 21 (H) 12 - 20      GFR est AA >60 >60 ml/min/1.73m2    GFR est non-AA >60 >60 ml/min/1.73m2    Calcium 9.3 8.5 - 10.1 MG/DL           Assessment:     Principal Problem:    History of diverticulitis of colon (8/15/2019)    Active Problems:    Colon cancer ([facility] [address] (8/15/2019)      Hypertension (8/15/2019)        3 Days Post-Op s/p hand-assisted laparoscopic sigmoid colon resection, mobilization of the splenic flexure, and coloproctostomy.     Hemodynamically stable. Hemoglobin stable.     Good urine output. Creatinine normal.     No evidence of infection.     GI function is returning.     Hypophosphatemia corrected. Needs to demonstrate ability to climb stairs. Plan:   Continue low fiber diet. Continue dicyclomine as needed for intestinal spasms. Discontinue Entereg. Remove RISHABH drain. Ambulate. Trial of stair climbing. Incentive spirometer. Probable discharge tomorrow.

## 2019-08-19 VITALS
RESPIRATION RATE: 16 BRPM | DIASTOLIC BLOOD PRESSURE: 83 MMHG | TEMPERATURE: 98.1 F | OXYGEN SATURATION: 98 % | HEART RATE: 71 BPM | SYSTOLIC BLOOD PRESSURE: 168 MMHG | WEIGHT: 128.9 LBS | BODY MASS INDEX: 21.48 KG/M2 | HEIGHT: 65 IN

## 2019-08-19 PROCEDURE — 74011250637 HC RX REV CODE- 250/637: Performed by: COLON & RECTAL SURGERY

## 2019-08-19 PROCEDURE — 74011250636 HC RX REV CODE- 250/636: Performed by: COLON & RECTAL SURGERY

## 2019-08-19 RX ORDER — HYDROMORPHONE HYDROCHLORIDE 2 MG/1
2 TABLET ORAL
Qty: 40 TAB | Refills: 0 | Status: SHIPPED | OUTPATIENT
Start: 2019-08-19 | End: 2019-08-24

## 2019-08-19 RX ORDER — DICYCLOMINE HYDROCHLORIDE 10 MG/1
10 CAPSULE ORAL
Qty: 60 CAP | Refills: 1 | Status: SHIPPED | OUTPATIENT
Start: 2019-08-19

## 2019-08-19 RX ORDER — ACETAMINOPHEN 500 MG
1000 TABLET ORAL EVERY 6 HOURS
Qty: 100 TAB | Refills: 0 | Status: SHIPPED
Start: 2019-08-19

## 2019-08-19 RX ADMIN — HYDROMORPHONE HYDROCHLORIDE 2 MG: 2 TABLET ORAL at 14:22

## 2019-08-19 RX ADMIN — ENOXAPARIN SODIUM 40 MG: 40 INJECTION SUBCUTANEOUS at 09:48

## 2019-08-19 RX ADMIN — LISINOPRIL 10 MG: 10 TABLET ORAL at 09:49

## 2019-08-19 RX ADMIN — ACETAMINOPHEN 1000 MG: 500 TABLET ORAL at 03:13

## 2019-08-19 RX ADMIN — ACETAMINOPHEN 1000 MG: 500 TABLET ORAL at 09:49

## 2019-08-19 NOTE — PROGRESS NOTES
Bedside shift change report given to Joe Tierney RN (oncoming nurse) by Haroldo Merino RN and Thomas Diaz RN (offgoing nurse). Report included the following information SBAR and Intake/Output.

## 2019-08-19 NOTE — PROGRESS NOTES
Transitions of Care plan:  -Discharge home today independently  -Follow up with Dr. Gina Cowart on August 23rd.     Valerie Sanz BSW, ACM

## 2019-08-19 NOTE — DISCHARGE INSTRUCTIONS
Discharge Instructions for [facility] Colorectal Surgery Patients       1. Do not lift any objects weighing more than 10 pounds for 4 weeks after the surgery date. 2. Do not do any housework including vacuuming, scrubbing or yardwork for 4 weeks after the surgery date. 3. Do not drive for two weeks after the surgery date or while taking sedating medications. 4. You should walk frequently and climb stairs as desired. 5. You may shower, but do not submerge your abdomen. Do not take tub baths, swim, or use hot tubs for 4 weeks. 6. You have surgical glue on your incisions. It will dissolve over time. Do not scrub around incisions. Keep the drain site wound covered with a clean dry pad, changing it once per day and as needed, until the wound has been dry and closed for at least 24 hours. 7. Follow low-fiber diet for 2 weeks. (See handbook for additional details). 8. Drink nutritional supplements 2 times per day until your diet and appetite are back to normal.     9. Multiple bowel movements are normal each day. Contact your surgeons office for any concerns. 10. Take Tylenol (1000 mg every 6 hours) around the clock until you no longer need any other pain medication, then take it as needed. Use the hydromorphone (Dilaudid) as needed for pain the is not adequately controlled despite having taken all of the Tylenol for which you are eligible. Us the dicyclomine (Bentyl) as prescribed to help manage intestinal spasms. 11. Follow up:  Please return to Dr. Phillip Nieves office at 2:00 PM on Friday 8/23/2019. If you need to change the appointment, please call [phone] on a weekday between 9:00 AM and noon. Please also schedule an appointment with your primary physician for sometime within the next 2 weeks to reevaluate your blood proessure management.     12. If you experience fever (greater than 100.5), chills, vomiting, increasing redness around, or drainage from a surgical site other than the drain site wound, please contact your surgeons office. 13. Please see handbook for additional instructions. If you have further questions, please call your surgeons office.

## 2019-08-19 NOTE — PROGRESS NOTES
Upon informing pt. of scheduled lab work, the pt. stated Yogi Porter withdraws her consent for blood\" and refused having her blood drawn. The pt. reason for refusal was because she \"was tired of being a pin cushion\" and was worried about her veins not being good. RN informed pt. she has the right to refuse and will not collect her morning labs.

## 2019-08-19 NOTE — DISCHARGE SUMMARY
Discharge Summary     Patient ID:    Lorraine Conway  [id number]  10 y.o.  1950    Admission Date: 8/15/2019    Discharge Date: 8/19/2019    Admission Diagnoses:  1. History of diverticulitis  2. Sigmoid colon cancer  3. Hypertension      Chronic Diagnoses:    Patient Active Problem List   Diagnosis Code    RBBB I45.10    History of diverticulitis of colon Z87.19    Colon cancer ([facility] C18.9    Hypertension I10       Discharge Diagnoses:  1. History of diverticulitis  2. Stage I sigmoid colon cancer (resected)  3. Hypertension  4. Hypophosphatemia (corrected)      Hospital Problems as of 8/19/2019 Date Reviewed: 8/15/2019          Codes Class Noted - Resolved POA    * (Principal) History of diverticulitis of colon (Chronic) ICD-10-CM: Z87.19  ICD-9-CM: V12.79  8/15/2019 - Present Yes        Colon cancer ([facility] ICD-10-CM: C18.9  ICD-9-CM: 153.9  8/15/2019 - Present Clinically Undetermined        Hypertension (Chronic) ICD-10-CM: I10  ICD-9-CM: 401.9  8/15/2019 - Present Yes              Procedures Performed:  1. Cystoscopy and placement of temporary bilateral ureteralcatheters was performed by Ralph Sawyer MD on 8/15/2019.  2.  Hand-assisted laparoscopic sigmoid colon resection, mobilization of the splenic flexure, and coloproctostomy was performed by Dr. Alena Rivera on 8/15/2019. Discharge Medications:   Current Discharge Medication List      START taking these medications    Details   acetaminophen (TYLENOL) 500 mg tablet Take 2 Tabs by mouth every six (6) hours. Qty: 100 Tab, Refills: 0      dicyclomine (BENTYL) 10 mg capsule Take 1 Cap by mouth four (4) times daily as needed for Other (Intestinal spasms. ). Qty: 60 Cap, Refills: 1      HYDROmorphone (DILAUDID) 2 mg tablet Take 1 Tab by mouth every four (4) hours as needed for Pain for up to 5 days.  Max Daily Amount: 12 mg.  Qty: 40 Tab, Refills: 0    Associated Diagnoses: Acute post-operative pain         CONTINUE these medications which have NOT CHANGED    Details   ubidecarenone (COQ-10 PO) Take  by mouth Daily (before breakfast). lisinopril (PRINIVIL, ZESTRIL) 10 mg tablet Take  by mouth daily. Diet:  Low fiber diet for two weeks. Activity:  No lifting more than 10 lb. or engaging in strenuous activities for 4 weeks. Stair climbing and frequent ambulation are encouraged. Wound Care:  Dry dressing to the drain site wound daily and as needed until the wound has been dry and closed for at least 24 hours. Discharge Condition:  Improved compared to that upon admission. Disposition:  Home. Follow-up Care:  1. Dr. Alena Rivera within one week. 2. Alayna Mosquera MD within 2 weeks if possible for reevaluation of blood pressure management. Significant Diagnostic Studies:   Recent Labs     08/18/19 0339 08/17/19 0211   WBC 5.0 7.0   HGB 11.8 12.3   HCT 36.4 38.2    193     Recent Labs     08/18/19 0339 08/17/19 0211    140   K 3.6 3.6    109*   CO2 28 28   BUN 14 9   CREA 0.68 0.73   * 122*   CA 9.3 9.2   MG  --  2.2   PHOS 3.5 1.9*       HOSPITAL COURSE & TREATMENT RENDERED:     The patient is a 71-year-old female who has had multiple episodes of diverticulitis and who also recently underwent the colonoscopic excision of a malignant polyp. Her first episode of diverticulitis probably occurred approximately 14 years ago. That was diagnosed by a CT scan and treated with a combination of parenteral and then oral antibiotics. She has had subsequent episodes that she believes were also attacks of diverticulitis. She has probably had five of these attacks in the last 5 years. At the end of February of this year, she developed symptoms and was diagnosed with diverticulitis via a CT scan performed at [facility].  She was treated with 10 days of oral ciprofloxacin and metronidazole, and the symptoms mostly resolved.   She eventually saw Dr. Elizabeth Weaver in consultation, and on 04/26/2019 she underwent a colonoscopy (which was her first). The procedure revealed severe sigmoid diverticulosis, a flat 15 mm hepatic flexure polyp, and a pedunculated 15 mm polyp that was located in the sigmoid colon approximately 17 cm from the anal verge. Both polyps were excised, and both proved to have been tubular adenomas; however, the sigmoid colon polyp also contained adenocarcinoma that invaded the polyp head and that was classified as Haggitt level I (pT1). A CT scan of the abdomen and pelvis with oral and IV contrast was performed on 05/16/2019, and it was interpreted as revealing sigmoid diverticulosis with mild mural thickening but \"no definite evidence of diverticulitis. \"  There was also no evidence of metastatic disease. Another CT scan of the abdomen and pelvis with oral and IV contrast was performed on 07/15/2019, and it was interpreted as revealing sigmoid diverticulosis, hepatic steatosis, and a small gallstone. She was referred for surgical consultation regarding the recurrent episodes of diverticulitis as well as the colon cancer. We discussed the rationale for surgical resection of the sigmoid colon, which included both reduction of risk of future episodes of diverticulitis and also staging and possible treatment of the colon cancer. The risks were discussed in detail, and she agreed to proceed. In preparation for surgery, Dr. Alvarado Mckeon performed another colonoscopy on her on 08/05/2019. That procedure was performed in order to evaluate the polypectomy site and to erica it. As it turned out, the site was not identifiable. Dr. Alvarado Mckeon, therefore, tattooed the colon at 17 cm from the anal verge, which was the distance at which the malignant polyp had previously been located. The patient was taken to the operating room on 8/15/2019, and there the above-listed procedures were performed without apparent complications.   Post-operatively, she was transferred from the recovery room to the floor in stable condition. Her hospital course was mostly remarkable for cramping abdominal discomfort that seemed to be most consistent with intestinal spasms and for which Bentyl (dicyclomie) was ordered and for hypertension that was treated using Lisinopril. She experienced a gradual return of gastrointestinal function, and her diet was advanced appropriately. By 8/19/2019 (POD#4), she was tolerating a low fiber diet, ambulating well, and moving her bowels. She was hemodynamically stable and afebrile, and there was no evidence of infection or other complication. At this point she was judged to be fit for discharge to home. Her condition upon discharge was improved compared to that upon admission, and she appeared to have understood all discharge and follow-up instructions.         Signed:  Catrachito Mcfarlane MD

## 2019-08-19 NOTE — PROGRESS NOTES
General Daily Progress Note    Admission Date: 8/15/2019  Hospital Day 5  Post-Op Day 4  Subjective:   Complains of some pain, but has only been taking Tylenol. Intestinal spasms have also continued, but she hasn't taken Bentyl since two nights ago. Ambulating well. Climbed stairs well yesterday with PT. Reports feeling very tired yesterday and would like to stay another day. I told her that I could not justify that medically. Objective:     Patient Vitals for the past 24 hrs:   BP Temp Pulse Resp SpO2 Weight   08/19/19 0740 168/83 98.1 °F (36.7 °C) 71 16 98 % 58.5 kg (128 lb 14.4 oz)   08/18/19 2338 149/74 98.4 °F (36.9 °C) 69 16 94 % --   08/18/19 2231 133/74 98.6 °F (37 °C) 73 16 94 % --   08/18/19 1456 130/64 97.9 °F (36.6 °C) 72 18 95 % --     No intake/output data recorded. 08/17 1901 - 08/19 0700  In: 300 [P.O.:300]  Out: 1050 [Urine:1000; Drains:50]    Physical Examination:  General Appearance - No distress. Abdomen - Appropriately tender.  Non-distended. Incisions clean, dry, intact, and with ecchymosis but no abnormal erythema.  RISHABH drain site wound clean and without erythema. Extremities - Compressions stockings in use. Data Review No results found for this or any previous visit (from the past 24 hour(s)). Assessment:     Principal Problem:    History of diverticulitis of colon (8/15/2019)    Active Problems:    Colon cancer ([facility] (8/15/2019)      Hypertension (8/15/2019)        4 Days Post-Op s/p hand-assisted laparoscopic sigmoid colon resection, mobilization of the splenic flexure, and coloproctostomy.     Hemodynamically stable. Hemoglobin stable.     Good urine output. Creatinine normal.     No evidence of infection.     GI function is returning.     Cleared from a physical therapy standpoint. Pathology results are pending. The patient appears to be fit for discharge to home. Plan:   Discharge to home. Follow up with me on Friday 8/23/2019.     Follow up with primary physician within 2 weeks regarding blood pressure management.

## 2019-08-19 NOTE — PROGRESS NOTES
Bedside shift change report given to Reena (oncoming nurse) by Lore Gibson (offgoing nurse). Report included the following information SBAR.

## 2020-09-05 ENCOUNTER — HOSPITAL ENCOUNTER (OUTPATIENT)
Dept: PREADMISSION TESTING | Age: 70
Discharge: HOME | End: 2020-09-05
Payer: MEDICARE

## 2020-09-05 DIAGNOSIS — Z01.812 PRE-PROCEDURE LAB EXAM: ICD-10-CM

## 2020-09-05 PROCEDURE — 87635 SARS-COV-2 COVID-19 AMP PRB: CPT

## 2020-09-06 LAB — SARS-COV-2, COV2NT: NOT DETECTED

## 2020-09-09 ENCOUNTER — HOSPITAL ENCOUNTER (OUTPATIENT)
Age: 70
Setting detail: OUTPATIENT SURGERY
Discharge: HOME | End: 2020-09-09
Attending: INTERNAL MEDICINE | Admitting: INTERNAL MEDICINE
Payer: MEDICARE

## 2020-09-09 ENCOUNTER — ANESTHESIA EVENT (OUTPATIENT)
Dept: ENDOSCOPY | Age: 70
End: 2020-09-09
Payer: MEDICARE

## 2020-09-09 ENCOUNTER — ANESTHESIA (OUTPATIENT)
Dept: ENDOSCOPY | Age: 70
End: 2020-09-09
Payer: MEDICARE

## 2020-09-09 VITALS
DIASTOLIC BLOOD PRESSURE: 83 MMHG | RESPIRATION RATE: 16 BRPM | HEART RATE: 56 BPM | TEMPERATURE: 97.9 F | OXYGEN SATURATION: 99 % | SYSTOLIC BLOOD PRESSURE: 159 MMHG | WEIGHT: 131 LBS | HEIGHT: 65 IN | BODY MASS INDEX: 21.83 KG/M2

## 2020-09-09 PROCEDURE — 74011000250 HC RX REV CODE- 250: Performed by: NURSE ANESTHETIST, CERTIFIED REGISTERED

## 2020-09-09 PROCEDURE — 74011250637 HC RX REV CODE- 250/637: Performed by: INTERNAL MEDICINE

## 2020-09-09 PROCEDURE — 76060000031 HC ANESTHESIA FIRST 0.5 HR: Performed by: INTERNAL MEDICINE

## 2020-09-09 PROCEDURE — 76040000019: Performed by: INTERNAL MEDICINE

## 2020-09-09 PROCEDURE — 88305 TISSUE EXAM BY PATHOLOGIST: CPT

## 2020-09-09 PROCEDURE — 77030021593 HC FCPS BIOP ENDOSC BSC -A: Performed by: INTERNAL MEDICINE

## 2020-09-09 PROCEDURE — 74011250636 HC RX REV CODE- 250/636: Performed by: NURSE ANESTHETIST, CERTIFIED REGISTERED

## 2020-09-09 RX ORDER — DEXTROMETHORPHAN/PSEUDOEPHED 2.5-7.5/.8
1.2 DROPS ORAL
Status: DISCONTINUED | OUTPATIENT
Start: 2020-09-09 | End: 2020-09-09 | Stop reason: HOSPADM

## 2020-09-09 RX ORDER — DIAZEPAM 10 MG/1
10 TABLET ORAL
COMMUNITY

## 2020-09-09 RX ORDER — EPINEPHRINE 0.1 MG/ML
1 INJECTION INTRACARDIAC; INTRAVENOUS
Status: DISCONTINUED | OUTPATIENT
Start: 2020-09-09 | End: 2020-09-09 | Stop reason: HOSPADM

## 2020-09-09 RX ORDER — PROPOFOL 10 MG/ML
INJECTION, EMULSION INTRAVENOUS AS NEEDED
Status: DISCONTINUED | OUTPATIENT
Start: 2020-09-09 | End: 2020-09-09 | Stop reason: HOSPADM

## 2020-09-09 RX ORDER — ATROPINE SULFATE 0.1 MG/ML
0.5 INJECTION INTRAVENOUS
Status: DISCONTINUED | OUTPATIENT
Start: 2020-09-09 | End: 2020-09-09 | Stop reason: HOSPADM

## 2020-09-09 RX ORDER — MIDAZOLAM HYDROCHLORIDE 1 MG/ML
.25-1 INJECTION, SOLUTION INTRAMUSCULAR; INTRAVENOUS
Status: DISCONTINUED | OUTPATIENT
Start: 2020-09-09 | End: 2020-09-09 | Stop reason: HOSPADM

## 2020-09-09 RX ORDER — SODIUM CHLORIDE 9 MG/ML
INJECTION, SOLUTION INTRAVENOUS
Status: DISCONTINUED | OUTPATIENT
Start: 2020-09-09 | End: 2020-09-09 | Stop reason: HOSPADM

## 2020-09-09 RX ORDER — LIDOCAINE HYDROCHLORIDE 20 MG/ML
INJECTION, SOLUTION EPIDURAL; INFILTRATION; INTRACAUDAL; PERINEURAL AS NEEDED
Status: DISCONTINUED | OUTPATIENT
Start: 2020-09-09 | End: 2020-09-09 | Stop reason: HOSPADM

## 2020-09-09 RX ORDER — FENTANYL CITRATE 50 UG/ML
100 INJECTION, SOLUTION INTRAMUSCULAR; INTRAVENOUS
Status: DISCONTINUED | OUTPATIENT
Start: 2020-09-09 | End: 2020-09-09 | Stop reason: HOSPADM

## 2020-09-09 RX ORDER — NALOXONE HYDROCHLORIDE 0.4 MG/ML
0.4 INJECTION, SOLUTION INTRAMUSCULAR; INTRAVENOUS; SUBCUTANEOUS
Status: DISCONTINUED | OUTPATIENT
Start: 2020-09-09 | End: 2020-09-09 | Stop reason: HOSPADM

## 2020-09-09 RX ORDER — SODIUM CHLORIDE 9 MG/ML
50 INJECTION, SOLUTION INTRAVENOUS CONTINUOUS
Status: DISCONTINUED | OUTPATIENT
Start: 2020-09-09 | End: 2020-09-09 | Stop reason: HOSPADM

## 2020-09-09 RX ORDER — FLUMAZENIL 0.1 MG/ML
0.2 INJECTION INTRAVENOUS
Status: DISCONTINUED | OUTPATIENT
Start: 2020-09-09 | End: 2020-09-09 | Stop reason: HOSPADM

## 2020-09-09 RX ORDER — SODIUM CHLORIDE 0.9 % (FLUSH) 0.9 %
5-40 SYRINGE (ML) INJECTION AS NEEDED
Status: DISCONTINUED | OUTPATIENT
Start: 2020-09-09 | End: 2020-09-09 | Stop reason: HOSPADM

## 2020-09-09 RX ORDER — SODIUM CHLORIDE 0.9 % (FLUSH) 0.9 %
5-40 SYRINGE (ML) INJECTION EVERY 8 HOURS
Status: DISCONTINUED | OUTPATIENT
Start: 2020-09-09 | End: 2020-09-09 | Stop reason: HOSPADM

## 2020-09-09 RX ADMIN — LIDOCAINE HYDROCHLORIDE 40 MG: 20 INJECTION, SOLUTION EPIDURAL; INFILTRATION; INTRACAUDAL; PERINEURAL at 10:30

## 2020-09-09 RX ADMIN — PROPOFOL 50 MG: 10 INJECTION, EMULSION INTRAVENOUS at 10:43

## 2020-09-09 RX ADMIN — SODIUM CHLORIDE: 900 INJECTION, SOLUTION INTRAVENOUS at 10:29

## 2020-09-09 RX ADMIN — PROPOFOL 100 MG: 10 INJECTION, EMULSION INTRAVENOUS at 10:35

## 2020-09-09 RX ADMIN — PROPOFOL 50 MG: 10 INJECTION, EMULSION INTRAVENOUS at 10:37

## 2020-09-09 RX ADMIN — PROPOFOL 50 MG: 10 INJECTION, EMULSION INTRAVENOUS at 10:39

## 2020-09-09 NOTE — H&P
[address]  [address] [facility] 140 [facility], [address]  [phone]                                History and Physical     NAME: Tone Quiñones   :  1950   MRN:  [id number]     HPI:  The patient was seen and examined. Past Surgical History:   Procedure Laterality Date    COLONOSCOPY N/A 2019    COLONOSCOPY performed by Tanya Martinez MD at [place] [address]. Left 2019    COLONOSCOPY (LATEX ALLERGY) performed by Tanya Martinez MD at [facility] ENDOSCOPY    HX GI      COLONOSCOPY     HX HEENT      [address] (PLASTIC SURGERY)     HX HEENT      BROKEN NOSE     HX OTHER SURGICAL      HX OTHER SURGICAL  08/15/2019    Hand-assisted laparoscopic sigmoid colon resection, mobilization of the splenic flexure, and coloproctostomy; Dr. Bette Arechiga.  HX DANIEL AND BSO  1996    Performed for treatment of a uterine fibroid and excessive bleeding.  HX UROLOGICAL  08/15/2019    Cystoscopy and placement of bilateral temporary ureteral catheters; Curtis Xiong MD.     Past Medical History:   Diagnosis Date    Asthma     Cholelithiasis     Incidental finding on CT scan.  Colon cancer ([address] 2019    Diverticulosis of colon     History of broken nose     History of colonic polyps     History of diverticulitis of colon     [place] (hard of hearing)     Hypertension     Ill-defined condition     FELL FROM A LADDER , BROKEN LEFT HAND     RBBB 2019    Vaginal delivery     Four times. Four episiotomies. Social History     Tobacco Use    Smoking status: Former Smoker     Packs/day: 0.50     Years: 5.00     Pack years: 2.50     Last attempt to quit:      Years since quittin.7    Smokeless tobacco: Never Used    Tobacco comment: 40 years ago in college   Substance Use Topics    Alcohol use:  Yes     Alcohol/week: 7.0 standard drinks     Types: 7 Glasses of wine per week    Drug use: Never     Allergies   Allergen Reactions    Latex Vertigo    Benadr [Diphenhydramine Hcl] Rash    Sulfa (Sulfonamide Antibiotics) Rash     Family History   Problem Relation Age of Onset    Alzheimer Mother     COPD Father    [facility] Diabetes Sister     Anesth Problems Neg Hx      No current facility-administered medications for this encounter. PHYSICAL EXAM:  General: WD, WN. Alert, cooperative, no acute distress    HEENT: NC, Atraumatic. PERRLA, EOMI. Anicteric sclerae. Lungs:  CTA Bilaterally. No Wheezing/Rhonchi/Rales. Heart:  Regular  rhythm,  No murmur, No Rubs, No Gallops  Abdomen: Soft, Non distended, Non tender. +Bowel sounds, no HSM  Extremities: No c/c/e  Neurologic:  CN 2-12 gi, Alert and oriented X 3. No acute neurological distress   Psych:   Good insight. Not anxious nor agitated. The heart, lungs and mental status were satisfactory for the administration of MAC sedation and for the procedure. Mallampati score: 2     The patient was counseled at length about the risks of lucien Covid-19 in the josiah-operative and post-operative states including the recovery window of their procedure. The patient was made aware that lucien Covid-19 after a surgical procedure may worsen their prognosis for recovering from the virus and lend to a higher morbidity and or mortality risk. The patient was given the options of postponing their procedure. All of the risks, benefits, and alternatives were discussed. The patient does wish to proceed with the procedure.       Assessment:   · History colon cancer    Plan:   · Endoscopic procedure :colonoscopy  · MAC sedation

## 2020-09-09 NOTE — PROCEDURES
[address] [facility].  [address] [place] [address] [address], [address]  [phone]                              Colonoscopy Procedure Note      Indications:  Personal history colon cancer s/p resection 2019     :  Radha Cooper MD    Referring Provider: Leena Jansen MD    Sedation:  MAC anesthesia    Procedure Details:  After informed consent was obtained with all risks and benefits of procedure explained and preoperative exam completed, the patient was taken to the endoscopy suite and placed in the left lateral decubitus position. Upon sequential sedation as per above, a digital rectal exam was performed per below. The Olympus videocolonoscope was inserted in the rectum and carefully advanced to the cecum, which was identified by the ileocecal valve and appendiceal orifice. The quality of preparation was good. [place] Bowel Prep Score :3/3/3. The colonoscope was slowly withdrawn with careful evaluation between folds. Retroflexion in the rectum was performed. Findings:   Rectum: small internal hemorrhoids  Sigmoid: healthy appearing sigmoid anastamosis  Descending Colon: normal  Transverse Colon: one 2 mm sessile polyp, removed with forceps  Ascending Colon: normal  Cecum: normal    Interventions:  polypectomy    Specimen Removed:    ID Type Source Tests Collected by Time Destination   1 : transverse colon polyp Preservative Colon, Transverse  Karlos Humphries MD 9/9/2020 1039 Pathology       Complications: None. EBL:  minimal    Impression:    See Postoperative diagnosis above    Recommendations:   - Await pathology. You should receive a letter within 2 weeks. - Resume normal medications.  - Recommend repeat colonoscopy in 3 years. Discharge Disposition:  Home in the company of a  when able to ambulate.     Radha Cooper MD  9/9/2020  10:48 AM

## 2020-09-09 NOTE — ANESTHESIA POSTPROCEDURE EVALUATION
Post-Anesthesia Evaluation and Assessment    Patient: Yung Hicks MRN: [id number]  SSN: [id number]    YOB: 1950  Age: 72 y.o. Sex: female      I have evaluated the patient and they are stable and ready for discharge from the PACU. Cardiovascular Function/Vital Signs  Visit Vitals  /66   Pulse (!) 59   Temp 36.6 °C (97.9 °F)   Resp 12   Ht 5' 5\" (1.651 m)   Wt 59.4 kg (131 lb)   SpO2 96%   BMI 21.80 kg/m²       Patient is status post MAC anesthesia for Procedure(s):  COLONOSCOPY    :-  ENDOSCOPIC POLYPECTOMY. Nausea/Vomiting: None    Postoperative hydration reviewed and adequate. Pain:  Pain Scale 1: Visual (09/09/20 1055)  Pain Intensity 1: 0 (09/09/20 1055)   Managed    Neurological Status: At baseline    Mental Status, Level of Consciousness: Alert and  oriented to person, place, and time    Pulmonary Status:   O2 Device: Room air (09/09/20 1055)   Adequate oxygenation and airway patent    Complications related to anesthesia: None    Post-anesthesia assessment completed. No concerns    Signed By: Bhargav Pollock MD     September 9, 2020              Procedure(s):  COLONOSCOPY    :-  ENDOSCOPIC POLYPECTOMY. general    <BSHSIANPOST>    INITIAL Post-op Vital signs:   Vitals Value Taken Time   /38 9/9/2020 11:05 AM   Temp     Pulse 56 9/9/2020 11:08 AM   Resp 9 9/9/2020 11:08 AM   SpO2 97 % 9/9/2020 11:08 AM   Vitals shown include unvalidated device data.

## 2020-09-09 NOTE — DISCHARGE INSTRUCTIONS
[address]  [address] [facility] [address] [facility] [address] [facility]                                  Abram Reyes  [id number]  1950    It was my pleasure seeing you for your procedure. You will also receive a summary report with the findings from this procedure and any further recommendations. If you had polyps removed or biopsies taken during your procedure, you will receive a separate letter from me within the next 2 weeks. If you don't receive this letter or if you have any questions, please call my office [phone]. Please take note of the post procedure instructions listed below. Dr. Jorden Hernandez    These instructions give you information on caring for yourself after your procedure. Call your doctor if you have any problems or questions after your procedure. HOME CARE  · Walk if you have belly cramping or gas. Walking will help get rid of the air and reduce the bloated feeling in your belly (abdomen). · Your IV site (where you received drugs) may be tender to touch. Place warm towels on the site; keep your arm up on two pillows if you have any swelling or soreness in the area. · You may shower. ACTIVITY:  · Take frequent rest periods and move at a slower pace for the next 24 hours. .  · You may resume your regular activity tomorrow if you are feeling back to normal.  · Do not drive or ride a bicycle for at least 24 hours (because of the medicine (anesthesia) used during the test). · Do not sign any important legal documents or use or operate any machinery for 24 hours  · Do not take sleeping medicines/nerve drugs for 24 hours unless the doctor tells you. · You can return to work/school tomorrow unless otherwise instructed. NUTRITION:  · Drink plenty of fluids to keep your pee (urine) clear or pale yellow  · Begin with a light meal and progress to your normal diet.  Heavy or fried foods are harder to digest and may make you feel sick to your stomach (nauseated). · Once you are feeling back to normal, you may resume your normal diet as instructed by your doctor. · Avoid alcoholic beverages for 24 hours or as instructed. IF YOU HAD BIOPSIES TAKEN OR POLYPS REMOVED DURING THE PROCEDURE:  · For the next 7 days, avoid all non-steroidal antiinflammatory medications such as Ibuprofen, Motrin, Advil, Alleve, Vanessa-seltzer, Goody's powder, BC powder. · If you do not have an heart condition that requires you to take a daily aspirin, you should avoid taking aspirin for 7 days. · Eat a soft diet for 24 hours. · Monitor your stools for any blood or dark black, tar-like, stools as this may be a sign of bleeding and if you see any blood, notify your doctor immediately. GET HELP RIGHT AWAY AND SEEK IMMEDIATE MEDICAL CARE IF:  · You have more than a spotting of blood in your stool. · You pass clumps of tissue (blood clots) or fill the toilet with blood. · Your belly is painfully swollen or puffy (abdominal distention). · You throw up (vomit). · You have a fever. · You have redness, pain or swelling at the IV site that last greater than two days. · You have abdominal pain or discomfort that is severe or gets worse throughout the day. Post-procedure diagnosis:  colon polyp, small hemorrhoids    Post-procedure recommendations:  Findings:   Rectum: small internal hemorrhoids  Sigmoid: healthy appearing sigmoid anastamosis  Descending Colon: normal  Transverse Colon: one 2 mm sessile polyp, removed with forceps  Ascending Colon: normal  Cecum: normal    Recommendations:   - Await pathology. You should receive a letter within 2 weeks. - Resume normal medications.  - Recommend repeat colonoscopy in 3 years. Learning About Coronavirus [phone])  Coronavirus [phone]): Overview  What is coronavirus (COVID-19)? The coronavirus disease (COVID-19) is caused by a virus.  It is an illness that was first found in [place], [place], in December 2019. It has since spread worldwide. The virus can cause fever, cough, and trouble breathing. In severe cases, it can cause pneumonia and make it hard to breathe without help. It can cause death. Coronaviruses are a large group of viruses. They cause the common cold. They also cause more serious illnesses like Middle East respiratory syndrome (MERS) and severe acute respiratory syndrome (SARS). COVID-19 is caused by a novel coronavirus. That means it's a new type that has not been seen in people before. This virus spreads person-to-person through droplets from coughing and sneezing. It can also spread when you are close to someone who is infected. And it can spread when you touch something that has the virus on it, such as a doorknob or a tabletop. What can you do to protect yourself from coronavirus (COVID-19)? The best way to protect yourself from getting sick is to:  · Avoid areas where there is an outbreak. · Avoid contact with people who may be infected. · Wash your hands often with soap or alcohol-based hand sanitizers. · Avoid crowds and try to stay at least 6 feet away from other people. · Wash your hands often, especially after you cough or sneeze. Use soap and water, and scrub for at least 20 seconds. If soap and water aren't available, use an alcohol-based hand . · Avoid touching your mouth, nose, and eyes. What can you do to avoid spreading the virus to others? To help avoid spreading the virus to others:  · Cover your mouth with a tissue when you cough or sneeze. Then throw the tissue in the trash. · Use a disinfectant to clean things that you touch often. · Stay home if you are sick or have been exposed to the virus. Don't go to school, work, or public areas. And don't use public transportation. · If you are sick:  ? Leave your home only if you need to get medical care. But call the doctor's office first so they know you're coming.  And wear a face mask, if you have one.  ? If you have a face mask, wear it whenever you're around other people. It can help stop the spread of the virus when you cough or sneeze. ? Clean and disinfect your home every day. Use household  and disinfectant wipes or sprays. Take special care to clean things that you grab with your hands. These include doorknobs, remote controls, phones, and handles on your refrigerator and microwave. And don't forget countertops, tabletops, bathrooms, and computer keyboards. When to call for help  Call 911 anytime you think you may need emergency care. For example, call if:  · You have severe trouble breathing. (You can't talk at all.)  · You have constant chest pain or pressure. · You are severely dizzy or lightheaded. · You are confused or can't think clearly. · Your face and lips have a blue color. · You pass out (lose consciousness) or are very hard to wake up. Call your doctor now if you develop symptoms such as:  · Shortness of breath. · Fever. · Cough. If you need to get care, call ahead to the doctor's office for instructions before you go. Make sure you wear a face mask, if you have one, to prevent exposing other people to the virus. Where can you get the latest information? The following health organizations are tracking and studying this virus. Their websites contain the most up-to-date information. Jose Garner also learn what to do if you think you may have been exposed to the virus. · U.S. Centers for Disease Control and Prevention (CDC): The CDC provides updated news about the disease and travel advice. The website also tells you how to prevent the spread of infection. www.cdc.gov  · World Health Organization [facility]): WHO offers information about the virus outbreaks. WHO also has travel advice. www.who.int  Current as of: April 1, 2020               Content Version: 12.4  © 4620-4446 [employer].    Care instructions adapted under license by your healthcare professional. If you have questions about a medical condition or this instruction, always ask your healthcare professional. [place] [address] any warranty or liability for your use of this information.

## 2020-09-09 NOTE — ROUTINE PROCESS
Tone Quiñones 1950 
[id number] Situation: 
Verbal report received from: William Lunsford Procedure: Procedure(s): 
COLONOSCOPY    :- 
ENDOSCOPIC POLYPECTOMY Background: 
 
Preoperative diagnosis: DIVERTICULITIS, ADENICARCINOMA OF SIGMOID COLON Postoperative diagnosis: colon polyp, small hemorrhoids :  Dr. Anushka Hamilton Assistant(s): Endoscopy Technician-1: Remy Newman Endoscopy RN-1: Reid Boyd RN Specimens:  
ID Type Source Tests Collected by Time Destination 1 : transverse colon polyp Preservative Colon, Transverse  Tanya Martinez MD 9/9/2020 1039 Pathology H. Pylori -no Assessment: 
Intra-procedure medications Anesthesia gave intra-procedure sedation and medications, see anesthesia flow sheet Intravenous fluids: NS@ Elida Enter Vital signs stable Abdominal assessment: round and soft Recommendation: 
Discharge patient per MD order Family or Friend -yes Permission to share finding with family or friend -yes

## 2020-09-09 NOTE — ANESTHESIA PREPROCEDURE EVALUATION
Relevant Problems   No relevant active problems       Anesthetic History   No history of anesthetic complications            Review of Systems / Medical History  Patient summary reviewed, nursing notes reviewed and pertinent labs reviewed    Pulmonary            Asthma : well controlled       Neuro/Psych   Within defined limits           Cardiovascular    Hypertension              Exercise tolerance: >4 METS     GI/Hepatic/Renal  Within defined limits           Pertinent negatives: No GERD   Endo/Other  Within defined limits           Other Findings            Physical Exam    Airway  Mallampati: III  TM Distance: 4 - 6 cm  Neck ROM: normal range of motion   Mouth opening: Normal     Cardiovascular  Regular rate and rhythm,  S1 and S2 normal,  no murmur, click, rub, or gallop             Dental  No notable dental hx       Pulmonary  Breath sounds clear to auscultation               Abdominal  GI exam deferred       Other Findings            Anesthetic Plan    ASA: 2  Anesthesia type: general          Induction: Intravenous  Anesthetic plan and risks discussed with: Patient

## 2020-09-09 NOTE — PERIOP NOTES

## 2021-06-09 ENCOUNTER — TRANSCRIBE ORDER (OUTPATIENT)
Dept: SCHEDULING | Age: 71
End: 2021-06-09

## 2021-06-09 DIAGNOSIS — C18.7 ADENOCARCINOMA OF SIGMOID COLON (HCC): ICD-10-CM

## 2021-06-09 DIAGNOSIS — R10.32 ABDOMINAL PAIN, LEFT LOWER QUADRANT: ICD-10-CM

## 2021-06-09 DIAGNOSIS — K57.92 DIVERTICULITIS: Primary | ICD-10-CM

## 2021-06-26 ENCOUNTER — HOSPITAL ENCOUNTER (OUTPATIENT)
Dept: CT IMAGING | Age: 71
Discharge: HOME | End: 2021-06-26
Attending: INTERNAL MEDICINE
Payer: MEDICARE

## 2021-06-26 DIAGNOSIS — K57.92 DIVERTICULITIS: ICD-10-CM

## 2021-06-26 DIAGNOSIS — R10.32 ABDOMINAL PAIN, LEFT LOWER QUADRANT: ICD-10-CM

## 2021-06-26 DIAGNOSIS — C18.7 ADENOCARCINOMA OF SIGMOID COLON (HCC): ICD-10-CM

## 2021-06-26 LAB — CREAT BLD-MCNC: 0.6 MG/DL (ref 0.6–1.3)

## 2021-06-26 PROCEDURE — 82565 ASSAY OF CREATININE: CPT

## 2021-06-26 PROCEDURE — 74177 CT ABD & PELVIS W/CONTRAST: CPT

## 2021-06-26 PROCEDURE — 74011000636 HC RX REV CODE- 636: Performed by: RADIOLOGY

## 2021-06-26 RX ADMIN — IOHEXOL 50 ML: 240 INJECTION, SOLUTION INTRATHECAL; INTRAVASCULAR; INTRAVENOUS; ORAL at 11:48

## 2021-06-26 RX ADMIN — IOPAMIDOL 100 ML: 755 INJECTION, SOLUTION INTRAVENOUS at 11:48

## 2022-01-25 ENCOUNTER — TRANSCRIBE ORDER (OUTPATIENT)
Dept: SCHEDULING | Age: 72
End: 2022-01-25

## 2022-01-25 DIAGNOSIS — R10.84 GENERALIZED ABDOMINAL PAIN: Primary | ICD-10-CM

## 2022-01-28 ENCOUNTER — HOSPITAL ENCOUNTER (OUTPATIENT)
Dept: CT IMAGING | Age: 72
Discharge: HOME | End: 2022-01-28
Attending: INTERNAL MEDICINE
Payer: MEDICARE

## 2022-01-28 DIAGNOSIS — R10.84 GENERALIZED ABDOMINAL PAIN: ICD-10-CM

## 2022-01-28 LAB — CREAT BLD-MCNC: 0.6 MG/DL (ref 0.6–1.3)

## 2022-01-28 PROCEDURE — 74177 CT ABD & PELVIS W/CONTRAST: CPT

## 2022-01-28 PROCEDURE — 74011000636 HC RX REV CODE- 636: Performed by: INTERNAL MEDICINE

## 2022-01-28 PROCEDURE — 82565 ASSAY OF CREATININE: CPT

## 2022-01-28 PROCEDURE — 74011000250 HC RX REV CODE- 250: Performed by: INTERNAL MEDICINE

## 2022-01-28 RX ORDER — BARIUM SULFATE 20 MG/ML
900 SUSPENSION ORAL
Status: COMPLETED | OUTPATIENT
Start: 2022-01-28 | End: 2022-01-28

## 2022-01-28 RX ADMIN — IOPAMIDOL 100 ML: 755 INJECTION, SOLUTION INTRAVENOUS at 11:03

## 2022-01-28 RX ADMIN — BARIUM SULFATE 900 ML: 20 SUSPENSION ORAL at 11:03

## 2022-03-18 PROBLEM — C18.9 COLON CANCER (HCC): Status: ACTIVE | Noted: 2019-08-15

## 2022-03-19 PROBLEM — I45.10 RBBB: Status: ACTIVE | Noted: 2019-08-07

## 2022-03-19 PROBLEM — I10 HYPERTENSION: Status: ACTIVE | Noted: 2019-08-15

## 2022-03-19 PROBLEM — Z87.19 HISTORY OF DIVERTICULITIS OF COLON: Status: ACTIVE | Noted: 2019-08-15

## 2022-11-07 ENCOUNTER — TRANSCRIBE ORDER (OUTPATIENT)
Dept: SCHEDULING | Age: 72
End: 2022-11-07

## 2022-11-07 DIAGNOSIS — K57.92 DIVERTICULITIS: ICD-10-CM

## 2022-11-07 DIAGNOSIS — C18.7 ADENOCARCINOMA OF SIGMOID COLON (HCC): ICD-10-CM

## 2022-11-07 DIAGNOSIS — R10.32 LEFT LOWER QUADRANT PAIN: ICD-10-CM

## 2022-11-07 DIAGNOSIS — R10.84 GENERALIZED ABDOMINAL PAIN: Primary | ICD-10-CM

## 2022-11-07 DIAGNOSIS — K58.9 IRRITABLE BOWEL SYNDROME: ICD-10-CM

## 2023-03-31 ENCOUNTER — HOSPITAL ENCOUNTER (OUTPATIENT)
Age: 73
Setting detail: OUTPATIENT SURGERY
Discharge: HOME | End: 2023-03-31
Attending: INTERNAL MEDICINE | Admitting: INTERNAL MEDICINE
Payer: MEDICARE

## 2023-03-31 ENCOUNTER — ANESTHESIA EVENT (OUTPATIENT)
Dept: ENDOSCOPY | Age: 73
End: 2023-03-31
Payer: MEDICARE

## 2023-03-31 ENCOUNTER — ANESTHESIA (OUTPATIENT)
Dept: ENDOSCOPY | Age: 73
End: 2023-03-31
Payer: MEDICARE

## 2023-03-31 VITALS
OXYGEN SATURATION: 99 % | RESPIRATION RATE: 11 BRPM | HEIGHT: 65 IN | HEART RATE: 59 BPM | SYSTOLIC BLOOD PRESSURE: 162 MMHG | TEMPERATURE: 97.6 F | BODY MASS INDEX: 20.94 KG/M2 | DIASTOLIC BLOOD PRESSURE: 79 MMHG | WEIGHT: 125.7 LBS

## 2023-03-31 PROCEDURE — 74011000250 HC RX REV CODE- 250: Performed by: NURSE ANESTHETIST, CERTIFIED REGISTERED

## 2023-03-31 PROCEDURE — 88305 TISSUE EXAM BY PATHOLOGIST: CPT

## 2023-03-31 PROCEDURE — 2709999900 HC NON-CHARGEABLE SUPPLY: Performed by: INTERNAL MEDICINE

## 2023-03-31 PROCEDURE — 74011250636 HC RX REV CODE- 250/636: Performed by: NURSE ANESTHETIST, CERTIFIED REGISTERED

## 2023-03-31 PROCEDURE — 76040000007: Performed by: INTERNAL MEDICINE

## 2023-03-31 PROCEDURE — 77030029384 HC SNR POLYP CAPTVR II BSC -B: Performed by: INTERNAL MEDICINE

## 2023-03-31 PROCEDURE — 76060000032 HC ANESTHESIA 0.5 TO 1 HR: Performed by: INTERNAL MEDICINE

## 2023-03-31 RX ORDER — PROPOFOL 10 MG/ML
INJECTION, EMULSION INTRAVENOUS AS NEEDED
Status: DISCONTINUED | OUTPATIENT
Start: 2023-03-31 | End: 2023-03-31 | Stop reason: HOSPADM

## 2023-03-31 RX ORDER — SODIUM CHLORIDE 0.9 % (FLUSH) 0.9 %
5-40 SYRINGE (ML) INJECTION AS NEEDED
Status: DISCONTINUED | OUTPATIENT
Start: 2023-03-31 | End: 2023-03-31 | Stop reason: HOSPADM

## 2023-03-31 RX ORDER — FENTANYL CITRATE 50 UG/ML
100 INJECTION, SOLUTION INTRAMUSCULAR; INTRAVENOUS
Status: DISCONTINUED | OUTPATIENT
Start: 2023-03-31 | End: 2023-03-31 | Stop reason: HOSPADM

## 2023-03-31 RX ORDER — DEXTROMETHORPHAN/PSEUDOEPHED 2.5-7.5/.8
1.2 DROPS ORAL
Status: DISCONTINUED | OUTPATIENT
Start: 2023-03-31 | End: 2023-03-31 | Stop reason: HOSPADM

## 2023-03-31 RX ORDER — MIDAZOLAM HYDROCHLORIDE 1 MG/ML
.25-1 INJECTION, SOLUTION INTRAMUSCULAR; INTRAVENOUS
Status: DISCONTINUED | OUTPATIENT
Start: 2023-03-31 | End: 2023-03-31 | Stop reason: HOSPADM

## 2023-03-31 RX ORDER — SODIUM CHLORIDE 9 MG/ML
INJECTION, SOLUTION INTRAVENOUS
Status: DISCONTINUED | OUTPATIENT
Start: 2023-03-31 | End: 2023-03-31 | Stop reason: HOSPADM

## 2023-03-31 RX ORDER — EPINEPHRINE 0.1 MG/ML
1 INJECTION INTRACARDIAC; INTRAVENOUS
Status: DISCONTINUED | OUTPATIENT
Start: 2023-03-31 | End: 2023-03-31 | Stop reason: HOSPADM

## 2023-03-31 RX ORDER — LIDOCAINE HYDROCHLORIDE 20 MG/ML
INJECTION, SOLUTION EPIDURAL; INFILTRATION; INTRACAUDAL; PERINEURAL AS NEEDED
Status: DISCONTINUED | OUTPATIENT
Start: 2023-03-31 | End: 2023-03-31 | Stop reason: HOSPADM

## 2023-03-31 RX ORDER — FLUMAZENIL 0.1 MG/ML
0.2 INJECTION INTRAVENOUS
Status: DISCONTINUED | OUTPATIENT
Start: 2023-03-31 | End: 2023-03-31 | Stop reason: HOSPADM

## 2023-03-31 RX ORDER — SODIUM CHLORIDE 0.9 % (FLUSH) 0.9 %
5-40 SYRINGE (ML) INJECTION EVERY 8 HOURS
Status: DISCONTINUED | OUTPATIENT
Start: 2023-03-31 | End: 2023-03-31 | Stop reason: HOSPADM

## 2023-03-31 RX ORDER — SODIUM CHLORIDE 9 MG/ML
50 INJECTION, SOLUTION INTRAVENOUS CONTINUOUS
Status: DISCONTINUED | OUTPATIENT
Start: 2023-03-31 | End: 2023-03-31 | Stop reason: HOSPADM

## 2023-03-31 RX ORDER — NALOXONE HYDROCHLORIDE 0.4 MG/ML
0.4 INJECTION, SOLUTION INTRAMUSCULAR; INTRAVENOUS; SUBCUTANEOUS
Status: DISCONTINUED | OUTPATIENT
Start: 2023-03-31 | End: 2023-03-31 | Stop reason: HOSPADM

## 2023-03-31 RX ORDER — ATROPINE SULFATE 0.1 MG/ML
0.5 INJECTION INTRAVENOUS
Status: DISCONTINUED | OUTPATIENT
Start: 2023-03-31 | End: 2023-03-31 | Stop reason: HOSPADM

## 2023-03-31 RX ADMIN — PROPOFOL 20 MG: 10 INJECTION, EMULSION INTRAVENOUS at 15:24

## 2023-03-31 RX ADMIN — PROPOFOL 20 MG: 10 INJECTION, EMULSION INTRAVENOUS at 15:16

## 2023-03-31 RX ADMIN — PROPOFOL 20 MG: 10 INJECTION, EMULSION INTRAVENOUS at 15:13

## 2023-03-31 RX ADMIN — PROPOFOL 20 MG: 10 INJECTION, EMULSION INTRAVENOUS at 15:14

## 2023-03-31 RX ADMIN — PROPOFOL 20 MG: 10 INJECTION, EMULSION INTRAVENOUS at 15:17

## 2023-03-31 RX ADMIN — PROPOFOL 100 MG: 10 INJECTION, EMULSION INTRAVENOUS at 15:02

## 2023-03-31 RX ADMIN — PROPOFOL 20 MG: 10 INJECTION, EMULSION INTRAVENOUS at 15:11

## 2023-03-31 RX ADMIN — LIDOCAINE HYDROCHLORIDE 60 MG: 20 INJECTION, SOLUTION EPIDURAL; INFILTRATION; INTRACAUDAL; PERINEURAL at 15:02

## 2023-03-31 RX ADMIN — PROPOFOL 20 MG: 10 INJECTION, EMULSION INTRAVENOUS at 15:05

## 2023-03-31 RX ADMIN — PROPOFOL 20 MG: 10 INJECTION, EMULSION INTRAVENOUS at 15:26

## 2023-03-31 RX ADMIN — PROPOFOL 20 MG: 10 INJECTION, EMULSION INTRAVENOUS at 15:07

## 2023-03-31 RX ADMIN — PROPOFOL 20 MG: 10 INJECTION, EMULSION INTRAVENOUS at 15:12

## 2023-03-31 RX ADMIN — PROPOFOL 20 MG: 10 INJECTION, EMULSION INTRAVENOUS at 15:06

## 2023-03-31 RX ADMIN — PROPOFOL 20 MG: 10 INJECTION, EMULSION INTRAVENOUS at 15:18

## 2023-03-31 RX ADMIN — PROPOFOL 20 MG: 10 INJECTION, EMULSION INTRAVENOUS at 14:29

## 2023-03-31 RX ADMIN — SODIUM CHLORIDE: 900 INJECTION, SOLUTION INTRAVENOUS at 14:21

## 2023-03-31 RX ADMIN — PROPOFOL 20 MG: 10 INJECTION, EMULSION INTRAVENOUS at 15:08

## 2023-03-31 RX ADMIN — PROPOFOL 20 MG: 10 INJECTION, EMULSION INTRAVENOUS at 15:15

## 2023-03-31 RX ADMIN — PROPOFOL 20 MG: 10 INJECTION, EMULSION INTRAVENOUS at 15:21

## 2023-03-31 RX ADMIN — PROPOFOL 20 MG: 10 INJECTION, EMULSION INTRAVENOUS at 15:09

## 2023-03-31 RX ADMIN — PROPOFOL 20 MG: 10 INJECTION, EMULSION INTRAVENOUS at 15:20

## 2023-03-31 RX ADMIN — PROPOFOL 20 MG: 10 INJECTION, EMULSION INTRAVENOUS at 15:23

## 2023-03-31 RX ADMIN — PROPOFOL 20 MG: 10 INJECTION, EMULSION INTRAVENOUS at 15:10

## 2023-03-31 RX ADMIN — PROPOFOL 20 MG: 10 INJECTION, EMULSION INTRAVENOUS at 15:19

## 2023-03-31 RX ADMIN — PROPOFOL 20 MG: 10 INJECTION, EMULSION INTRAVENOUS at 15:27

## 2023-03-31 NOTE — H&P
[address]  [address] [facility] [address], [address]  [phone]                                History and Physical     NAME: Silverio Gross   :  1950   MRN:  [id number]     HPI:  The patient was seen and examined. Past Surgical History:   Procedure Laterality Date    COLONOSCOPY N/A 2019    COLONOSCOPY performed by Nathaniel Song MD at [facility] ENDOSCOPY    COLONOSCOPY Left 2019    COLONOSCOPY (LATEX ALLERGY) performed by Nathaniel Song MD at [facility] ENDOSCOPY    COLONOSCOPY N/A 2020    COLONOSCOPY    :- performed by Nathaniel Song MD at [facility] ENDOSCOPY    HX GI      COLONOSCOPY     HX HEENT      [address] (PLASTIC SURGERY)     HX HEENT      BROKEN NOSE     HX OTHER SURGICAL      HX OTHER SURGICAL  08/15/2019    Hand-assisted laparoscopic sigmoid colon resection, mobilization of the splenic flexure, and coloproctostomy; Dr. Beatris Vargas. [address]    Performed for treatment of a uterine fibroid and excessive bleeding. HX UROLOGICAL  08/15/2019    Cystoscopy and placement of bilateral temporary ureteral catheters; Krish Ledesma MD.     Past Medical History:   Diagnosis Date    Asthma     Cholelithiasis     Incidental finding on CT scan. Colon cancer ([address] 2019    Diverticulosis of colon     History of broken nose     History of colonic polyps     History of diverticulitis of colon     [place] (hard of hearing)     Hypertension     Ill-defined condition     FELL FROM A LADDER , BROKEN LEFT HAND     RBBB 2019    Vaginal delivery     Four times. Four episiotomies. Social History     Tobacco Use    Smoking status: Former     Packs/day: 0.50     Years: 5.00     Pack years: 2.50     Types: Cigarettes     Quit date:      Years since quittin.2    Smokeless tobacco: Never    Tobacco comments:     40 years ago in college   Substance Use Topics    Alcohol use:  Yes     Alcohol/week: 7.0 standard drinks     Types: 7 Glasses of wine per week    Drug use: Never     Allergies   Allergen Reactions    Latex Vertigo    Benadr [Diphenhydramine Hcl] Rash    Other Plant, Animal, Environmental Rash     masks    Sulfa (Sulfonamide Antibiotics) Rash     Family History   Problem Relation Age of Onset    Alzheimer's Disease Mother     COPD Father     Diabetes Sister     Anesth Problems Neg Hx      No current facility-administered medications for this encounter. Facility-Administered Medications Ordered in Other Encounters   Medication Dose Route Frequency    0.9% sodium chloride infusion   IntraVENous CONTINUOUS         PHYSICAL EXAM:  General: WD, WN. Alert, cooperative, no acute distress    HEENT: NC, Atraumatic. PERRLA, EOMI. Anicteric sclerae. Lungs:  CTA Bilaterally. No Wheezing/Rhonchi/Rales. Heart:  Regular  rhythm,  No murmur, No Rubs, No Gallops  Abdomen: Soft, Non distended, Non tender. +Bowel sounds, no HSM  Extremities: No c/c/e  Neurologic:  CN 2-12 gi, Alert and oriented X 3. No acute neurological distress   Psych:   Good insight. Not anxious nor agitated. The heart, lungs and mental status were satisfactory for the administration of MAC sedation and for the procedure. Mallampati score: 2     The patient was counseled at length about the risks of lucien Covid-19 in the josiah-operative and post-operative states including the recovery window of their procedure. The patient was made aware that lucien Covid-19 after a surgical procedure may worsen their prognosis for recovering from the virus and lend to a higher morbidity and or mortality risk. The patient was given the options of postponing their procedure. All of the risks, benefits, and alternatives were discussed. The patient does wish to proceed with the procedure.       Assessment:   H 2019 T1 sigmoid adenocarcinoma (involving head of pedunculated polyp with clear borders)    Plan:   Endoscopic procedure colonoscopy   MAC sedation

## 2023-03-31 NOTE — DISCHARGE INSTRUCTIONS
[address]  [address] [facility]                                  Felton Luna  [id number]  1950    It was my pleasure seeing you for your procedure. You will also receive a summary report with the findings from this procedure and any further recommendations. If you had polyps removed or biopsies taken during your procedure, you will receive a separate letter from me within the next 2 weeks. If you don't receive this letter or if you have any questions, please call my office [phone]. Please take note of the post procedure instructions listed below. Dr. Aiyana Hernandez    These instructions give you information on caring for yourself after your procedure. Call your doctor if you have any problems or questions after your procedure. HOME CARE  Walk if you have belly cramping or gas. Walking will help get rid of the air and reduce the bloated feeling in your belly (abdomen). Your IV site (where you received drugs) may be tender to touch. Place warm towels on the site; keep your arm up on two pillows if you have any swelling or soreness in the area. You may shower. ACTIVITY:  Take frequent rest periods and move at a slower pace for the next 24 hours. .  You may resume your regular activity tomorrow if you are feeling back to normal.  Do not drive or ride a bicycle for at least 24 hours (because of the medicine (anesthesia) used during the test). Do not sign any important legal documents or use or operate any machinery for 24 hours  Do not take sleeping medicines/nerve drugs for 24 hours unless the doctor tells you. You can return to work/school tomorrow unless otherwise instructed. NUTRITION:  Drink plenty of fluids to keep your pee (urine) clear or pale yellow  Begin with a light meal and progress to your normal diet.  Heavy or fried foods are harder to digest and may make you feel sick to your stomach (nauseated). Once you are feeling back to normal, you may resume your normal diet as instructed by your doctor. Avoid alcoholic beverages for 24 hours or as instructed. IF YOU HAD BIOPSIES TAKEN OR POLYPS REMOVED DURING THE PROCEDURE:  For the next 7 days, avoid all non-steroidal antiinflammatory medications such as Ibuprofen, Motrin, Advil, Alleve, Vanessa-seltzer, Goody's powder, BC powder. If you do not have an heart condition that requires you to take a daily aspirin, you should avoid taking aspirin for 7 days. Eat a soft diet for 24 hours. Monitor your stools for any blood or dark black, tar-like, stools as this may be a sign of bleeding and if you see any blood, notify your doctor immediately. GET HELP RIGHT AWAY AND SEEK IMMEDIATE MEDICAL CARE IF:  You have more than a spotting of blood in your stool. You pass clumps of tissue (blood clots) or fill the toilet with blood. Your belly is painfully swollen or puffy (abdominal distention). You throw up (vomit). You have a fever. You have redness, pain or swelling at the IV site that last greater than two days. You have abdominal pain or discomfort that is severe or gets worse throughout the day. Post-procedure diagnosis:  colon polyp. hemorrhoids    Post-procedure recommendations:    Findings:   Rectum: small internal hemorrhoids  Sigmoid: healthy appearing sigmoid anastamosis  Descending Colon: one 2 mm sessile polyp, removed with cold snare  Transverse Colon: one 2 mm sessile polyp, removed with forceps, two sessile polyps (3 mm), removed with cold snare  Ascending Colon: one 4 mm sessile polyp on back of IC-valve, removed with cold snare   Cecum: normal  Ileum: normal       Recommendations:   - Await pathology. You should receive a letter within 2 weeks. - Resume normal medications.  - Recommend repeat colonoscopy in 3 years.          Learning About Coronavirus (COVID-19)  Coronavirus [phone]): Overview  What is coronavirus (COVID-19)? The coronavirus disease (COVID-19) is caused by a virus. It is an illness that was first found in [place], [place], in December 2019. It has since spread worldwide. The virus can cause fever, cough, and trouble breathing. In severe cases, it can cause pneumonia and make it hard to breathe without help. It can cause death. Coronaviruses are a large group of viruses. They cause the common cold. They also cause more serious illnesses like Middle East respiratory syndrome (MERS) and severe acute respiratory syndrome (SARS). COVID-19 is caused by a novel coronavirus. That means it's a new type that has not been seen in people before. This virus spreads person-to-person through droplets from coughing and sneezing. It can also spread when you are close to someone who is infected. And it can spread when you touch something that has the virus on it, such as a doorknob or a tabletop. What can you do to protect yourself from coronavirus (COVID-19)? The best way to protect yourself from getting sick is to: Avoid areas where there is an outbreak. Avoid contact with people who may be infected. Wash your hands often with soap or alcohol-based hand sanitizers. Avoid crowds and try to stay at least 6 feet away from other people. Wash your hands often, especially after you cough or sneeze. Use soap and water, and scrub for at least 20 seconds. If soap and water aren't available, use an alcohol-based hand . Avoid touching your mouth, nose, and eyes. What can you do to avoid spreading the virus to others? To help avoid spreading the virus to others:  Cover your mouth with a tissue when you cough or sneeze. Then throw the tissue in the trash. Use a disinfectant to clean things that you touch often. Stay home if you are sick or have been exposed to the virus. Don't go to school, work, or public areas. And don't use public transportation.   If you are sick:  Leave your home only if you need to get medical care. But call the doctor's office first so they know you're coming. And wear a face mask, if you have one. If you have a face mask, wear it whenever you're around other people. It can help stop the spread of the virus when you cough or sneeze. Clean and disinfect your home every day. Use household  and disinfectant wipes or sprays. Take special care to clean things that you grab with your hands. These include doorknobs, remote controls, phones, and handles on your refrigerator and microwave. And don't forget countertops, tabletops, bathrooms, and computer keyboards. When to call for help  Call 911 anytime you think you may need emergency care. For example, call if:  You have severe trouble breathing. (You can't talk at all.)  You have constant chest pain or pressure. You are severely dizzy or lightheaded. You are confused or can't think clearly. Your face and lips have a blue color. You pass out (lose consciousness) or are very hard to wake up. Call your doctor now if you develop symptoms such as:  Shortness of breath. Fever. Cough. If you need to get care, call ahead to the doctor's office for instructions before you go. Make sure you wear a face mask, if you have one, to prevent exposing other people to the virus. Where can you get the latest information? The following health organizations are tracking and studying this virus. Their websites contain the most up-to-date information. [facility] also learn what to do if you think you may have been exposed to the virus. U.S. Centers for Disease Control and Prevention (CDC): The CDC provides updated news about the disease and travel advice. The website also tells you how to prevent the spread of infection. www.cdc.gov  World Health Organization [facility]): WHO offers information about the virus outbreaks. WHO also has travel advice. www.who.int  Current as of: April 1, 2020               Content Version: 12.4  © 4836-8365 Healthwise, Incorporated.    Care instructions adapted under license by your healthcare professional. If you have questions about a medical condition or this instruction, always ask your healthcare professional. [place] [address] any warranty or liability for your use of this information.

## 2023-03-31 NOTE — PROCEDURES
[address] [facility]  [address] [facility] [address], [address]  [phone]                              Colonoscopy Procedure Note      Indications:   Personal history sigmoid colon cancer s/p resection 2019       :  Kath Romero MD    Staff: Endoscopy Technician-1: Romana Butler  Endoscopy RN-1: Dorian Mg RN    Referring Provider: Chai Wilcox MD    Sedation:  MAC anesthesia    Procedure Details:  After informed consent was obtained with all risks and benefits of procedure explained and preoperative exam completed, the patient was taken to the endoscopy suite and placed in the left lateral decubitus position. Upon sequential sedation as per above, a digital rectal exam was performed per below. The Olympus videocolonoscope was inserted in the rectum and carefully advanced to the terminal ileum. The quality of preparation was good. [place] Bowel Prep Score : 3/3/3. The colonoscope was slowly withdrawn with careful evaluation between folds. Retroflexion in the rectum was performed. Findings:   Rectum: small internal hemorrhoids  Sigmoid: healthy appearing sigmoid anastamosis  Descending Colon: one 2 mm sessile polyp, removed with cold snare  Transverse Colon: one 2 mm sessile polyp, removed with forceps, two sessile polyps (3 mm), removed with cold snare  Ascending Colon: one 4 mm sessile polyp on back of IC-valve, removed with cold snare   Cecum: normal  Ileum: normal     Interventions:  polypectomy     Specimen Removed:    ID Type Source Tests Collected by Time Destination   1 : right colon Preservative   Ian Carvajal MD 3/31/2023 1514 Pathology   2 : polyp Preservative Colon, Ileocecal valve  Ian Carvajal MD 3/31/2023 1520 Pathology   3 : left colon Preservative   Ian Carvajal MD 3/31/2023 1530 Pathology       Complications: None. EBL:      Impression:    See Postoperative diagnosis above    Recommendations:   - Await pathology.  You should receive a letter within 2 weeks. - Resume normal medications.  - Recommend repeat colonoscopy in 3 years. Discharge Disposition:  Home in the company of a  when able to ambulate.     Catina Ambrose MD  3/31/2023  3:52 PM

## 2023-03-31 NOTE — ANESTHESIA POSTPROCEDURE EVALUATION
Post-Anesthesia Evaluation and Assessment    Patient: Thai Hernandez MRN: [id number]  SSN: [id number]    YOB: 1950  Age: 71 y.o. Sex: female      I have evaluated the patient and they are stable and ready for discharge from the PACU. Cardiovascular Function/Vital Signs  Visit Vitals  /74   Pulse 70   Temp 36.4 °C (97.6 °F)   Resp 16   Ht 5' 5\" (1.651 m)   Wt 57 kg (125 lb 11.2 oz)   SpO2 95%   Breastfeeding No   BMI 20.92 kg/m²       Patient is status post MAC anesthesia for Procedure(s):  COLONOSCOPY  ENDOSCOPIC POLYPECTOMY. Nausea/Vomiting: None    Postoperative hydration reviewed and adequate. Pain:  Pain Scale 1: Visual (03/31/23 1540)  Pain Intensity 1: 0 (03/31/23 1540)   Managed    Neurological Status: At baseline    Mental Status, Level of Consciousness: Alert and  oriented to person, place, and time    Pulmonary Status:   O2 Device: None (Room air) (03/31/23 1540)   Adequate oxygenation and airway patent    Complications related to anesthesia: None    Post-anesthesia assessment completed. No concerns    Signed By: Miya Bautista MD     March 31, 2023              Procedure(s):  COLONOSCOPY  ENDOSCOPIC POLYPECTOMY. general    <BSHSIANPOST>    INITIAL Post-op Vital signs:   Vitals Value Taken Time   /77 03/31/23 1553   Temp 36.4 °C (97.6 °F) 03/31/23 1540   Pulse 57 03/31/23 1555   Resp 13 03/31/23 1555   SpO2 99 % 03/31/23 1555   Vitals shown include unvalidated device data.

## 2025-02-25 ENCOUNTER — TRANSCRIBE ORDERS (OUTPATIENT)
Facility: HOSPITAL | Age: 75
End: 2025-02-25

## 2025-02-25 DIAGNOSIS — R19.7 DIARRHEA, UNSPECIFIED TYPE: Primary | ICD-10-CM

## 2025-02-25 DIAGNOSIS — K58.0 IRRITABLE BOWEL SYNDROME WITH DIARRHEA: ICD-10-CM

## 2025-02-25 DIAGNOSIS — K57.92 DIVERTICULITIS: ICD-10-CM

## 2025-04-29 ENCOUNTER — HOSPITAL ENCOUNTER (OUTPATIENT)
Facility: HOSPITAL | Age: 75
Discharge: HOME | End: 2025-05-02
Attending: INTERNAL MEDICINE
Payer: MEDICARE

## 2025-04-29 DIAGNOSIS — K57.92 DIVERTICULITIS: ICD-10-CM

## 2025-04-29 DIAGNOSIS — K58.0 IRRITABLE BOWEL SYNDROME WITH DIARRHEA: ICD-10-CM

## 2025-04-29 DIAGNOSIS — R19.7 DIARRHEA, UNSPECIFIED TYPE: ICD-10-CM

## 2025-04-29 LAB — CREAT BLD-MCNC: 0.5 MG/DL (ref 0.6–1.3)

## 2025-04-29 PROCEDURE — 6360000004 HC RX CONTRAST MEDICATION: Performed by: INTERNAL MEDICINE

## 2025-04-29 PROCEDURE — 2500000003 HC RX 250 WO HCPCS: Performed by: INTERNAL MEDICINE

## 2025-04-29 PROCEDURE — 82565 ASSAY OF CREATININE: CPT

## 2025-04-29 PROCEDURE — 74177 CT ABD & PELVIS W/CONTRAST: CPT

## 2025-04-29 RX ORDER — IOPAMIDOL 755 MG/ML
100 INJECTION, SOLUTION INTRAVASCULAR
Status: COMPLETED | OUTPATIENT
Start: 2025-04-29 | End: 2025-04-29

## 2025-04-29 RX ADMIN — IOPAMIDOL 100 ML: 755 INJECTION, SOLUTION INTRAVENOUS at 13:49

## 2025-04-29 RX ADMIN — BARIUM SULFATE 900 ML: 20 SUSPENSION ORAL at 13:49

## 2025-07-14 ENCOUNTER — ANESTHESIA (OUTPATIENT)
Facility: HOSPITAL | Age: 75
End: 2025-07-14
Payer: MEDICARE

## 2025-07-14 ENCOUNTER — ANESTHESIA EVENT (OUTPATIENT)
Facility: HOSPITAL | Age: 75
End: 2025-07-14
Payer: MEDICARE

## 2025-07-14 ENCOUNTER — HOSPITAL ENCOUNTER (OUTPATIENT)
Facility: HOSPITAL | Age: 75
Setting detail: OUTPATIENT SURGERY
Discharge: HOME | End: 2025-07-14
Attending: INTERNAL MEDICINE | Admitting: INTERNAL MEDICINE
Payer: MEDICARE

## 2025-07-14 VITALS
DIASTOLIC BLOOD PRESSURE: 79 MMHG | HEART RATE: 62 BPM | HEIGHT: 65 IN | SYSTOLIC BLOOD PRESSURE: 159 MMHG | WEIGHT: 122 LBS | OXYGEN SATURATION: 98 % | RESPIRATION RATE: 13 BRPM | TEMPERATURE: 97.7 F | BODY MASS INDEX: 20.33 KG/M2

## 2025-07-14 PROCEDURE — 7100000010 HC PHASE II RECOVERY - FIRST 15 MIN: Performed by: INTERNAL MEDICINE

## 2025-07-14 PROCEDURE — 3700000001 HC ADD 15 MINUTES (ANESTHESIA): Performed by: INTERNAL MEDICINE

## 2025-07-14 PROCEDURE — 7100000011 HC PHASE II RECOVERY - ADDTL 15 MIN: Performed by: INTERNAL MEDICINE

## 2025-07-14 PROCEDURE — 3700000000 HC ANESTHESIA ATTENDED CARE: Performed by: INTERNAL MEDICINE

## 2025-07-14 PROCEDURE — 3600007512: Performed by: INTERNAL MEDICINE

## 2025-07-14 PROCEDURE — 3600007502: Performed by: INTERNAL MEDICINE

## 2025-07-14 PROCEDURE — 2580000003 HC RX 258: Performed by: NURSE ANESTHETIST, CERTIFIED REGISTERED

## 2025-07-14 PROCEDURE — 6360000002 HC RX W HCPCS: Performed by: NURSE ANESTHETIST, CERTIFIED REGISTERED

## 2025-07-14 PROCEDURE — 88305 TISSUE EXAM BY PATHOLOGIST: CPT

## 2025-07-14 PROCEDURE — 2709999900 HC NON-CHARGEABLE SUPPLY: Performed by: INTERNAL MEDICINE

## 2025-07-14 RX ORDER — LIDOCAINE HYDROCHLORIDE 20 MG/ML
INJECTION, SOLUTION EPIDURAL; INFILTRATION; INTRACAUDAL; PERINEURAL
Status: DISCONTINUED | OUTPATIENT
Start: 2025-07-14 | End: 2025-07-14 | Stop reason: SDUPTHER

## 2025-07-14 RX ORDER — SODIUM CHLORIDE 9 MG/ML
INJECTION, SOLUTION INTRAVENOUS CONTINUOUS
Status: DISCONTINUED | OUTPATIENT
Start: 2025-07-14 | End: 2025-07-14 | Stop reason: HOSPADM

## 2025-07-14 RX ORDER — SODIUM CHLORIDE 0.9 % (FLUSH) 0.9 %
5-40 SYRINGE (ML) INJECTION PRN
Status: DISCONTINUED | OUTPATIENT
Start: 2025-07-14 | End: 2025-07-14 | Stop reason: HOSPADM

## 2025-07-14 RX ORDER — SODIUM CHLORIDE 0.9 % (FLUSH) 0.9 %
5-40 SYRINGE (ML) INJECTION EVERY 12 HOURS SCHEDULED
Status: DISCONTINUED | OUTPATIENT
Start: 2025-07-14 | End: 2025-07-14 | Stop reason: HOSPADM

## 2025-07-14 RX ORDER — SODIUM CHLORIDE 9 MG/ML
INJECTION, SOLUTION INTRAVENOUS PRN
Status: DISCONTINUED | OUTPATIENT
Start: 2025-07-14 | End: 2025-07-14 | Stop reason: HOSPADM

## 2025-07-14 RX ORDER — SODIUM CHLORIDE 9 MG/ML
INJECTION, SOLUTION INTRAVENOUS
Status: DISCONTINUED | OUTPATIENT
Start: 2025-07-14 | End: 2025-07-14 | Stop reason: SDUPTHER

## 2025-07-14 RX ADMIN — PROPOFOL 25 MG: 10 INJECTION, EMULSION INTRAVENOUS at 09:03

## 2025-07-14 RX ADMIN — PROPOFOL 25 MG: 10 INJECTION, EMULSION INTRAVENOUS at 08:54

## 2025-07-14 RX ADMIN — PROPOFOL 25 MG: 10 INJECTION, EMULSION INTRAVENOUS at 08:57

## 2025-07-14 RX ADMIN — PROPOFOL 50 MG: 10 INJECTION, EMULSION INTRAVENOUS at 08:49

## 2025-07-14 RX ADMIN — SODIUM CHLORIDE: 9 INJECTION, SOLUTION INTRAVENOUS at 08:32

## 2025-07-14 RX ADMIN — PROPOFOL 25 MG: 10 INJECTION, EMULSION INTRAVENOUS at 09:00

## 2025-07-14 RX ADMIN — PROPOFOL 50 MG: 10 INJECTION, EMULSION INTRAVENOUS at 08:51

## 2025-07-14 RX ADMIN — LIDOCAINE HYDROCHLORIDE 40 MG: 20 INJECTION, SOLUTION EPIDURAL; INFILTRATION; INTRACAUDAL; PERINEURAL at 08:49

## 2025-07-14 ASSESSMENT — PAIN SCALES - GENERAL: PAINLEVEL_OUTOF10: 0

## 2025-07-14 NOTE — ANESTHESIA POSTPROCEDURE EVALUATION
Department of Anesthesiology  Postprocedure Note    Patient: Trinity Benavides  MRN: [id number]  YOB: 1950  Date of evaluation: 7/14/2025    Procedure Summary       Date: 07/14/25 Room / Location: [facility] ENDO 04 / [facility] ENDOSCOPY    Anesthesia Start: 0847 Anesthesia Stop: 0908    Procedure: COLONOSCOPY (Lower GI Region) Diagnosis:       Diverticulitis      Cancer of sigmoid colon (HCC)      Irritable bowel syndrome, unspecified type      Constipation, unspecified constipation type      (Diverticulitis [K57.92])      (Cancer of sigmoid colon (HCC) [C18.7])      (Irritable bowel syndrome, unspecified type [K58.9])      (Constipation, unspecified constipation type [K59.00])    Surgeons: Felicity Lyons MD Responsible Provider: Kt Morse MD    Anesthesia Type: MAC ASA Status: 2            Anesthesia Type: MAC    Carli Phase I: Carli Score: 10    Carli Phase II: Carli Score: 9    Anesthesia Post Evaluation    Patient location during evaluation: bedside  Nausea & Vomiting: no nausea  Cardiovascular status: blood pressure returned to baseline  Respiratory status: acceptable  Hydration status: euvolemic    No notable events documented.

## 2025-07-14 NOTE — DISCHARGE INSTRUCTIONS
[facility]  [phone]                                  Trinity Benavides  [id number]  1950    It was my pleasure seeing you for your procedure.  You will also receive a summary report with the findings from this procedure and any further recommendations.  If you had polyps removed or biopsies taken during your procedure, you will receive a separate letter from me within approximately the next 2 weeks.  If you don't receive this letter or if you have any questions, please call my office [phone].     Please take note of the post procedure instructions listed below.    Dr. Serena Langford      CARE FOLLOWING YOUR PROCEDURE    These instructions give you information on caring for yourself after your procedure. Call your doctor if you have any problems or questions after your procedure.    HOME CARE  Walk if you have belly cramping or gas.  Walking will help get rid of the air and reduce the bloated feeling in your belly (abdomen).  Your IV site (where you received drugs) may be tender to touch.  Place warm towels on the site; keep your arm up on two pillows if you have any swelling or soreness in the area.  You may shower.    ACTIVITY:  Take frequent rest periods and move at a slower pace for the next 24 hours..  You may resume your regular activity tomorrow if you are feeling back to normal.  Do not drive or ride a bicycle for at least 24 hours (because of the medicine (anesthesia) used during the test).  Do not sign any important legal documents or use or operate any machinery for 24 hours  Do not take sleeping medicines/nerve drugs for 24 hours unless the doctor tells you.  You can return to work/school tomorrow unless otherwise instructed.    NUTRITION:  Drink plenty of fluids to keep your pee (urine) clear or pale yellow  Begin with a light meal and progress to your normal diet. Heavy or fried foods are harder to digest and may make you feel sick to your stomach

## 2025-07-14 NOTE — PROGRESS NOTES
Initial RN admission and assessment performed and documented in Endoscopy navigator.     Patient evaluated by anesthesia in pre-procedure holding.     All procedural vital signs, airway assessment, and level of consciousness information monitored and recorded by anesthesia staff on the anesthesia record.     Report received from CRNA post procedure.  Patient transported to recovery area by RN.    Endoscopy post procedure time out was performed and specimens were verified by physician.    Endoscope was pre-cleaned at bedside immediately following procedure by David VARGAS

## 2025-07-14 NOTE — OP NOTE
[facility]  [address]  [place] [address] [phone]                              Colonoscopy Procedure Note      Indications:   History of T1 sigmoid adenocarcinoma (involving head of pedunculated polyp with clear borders) and recurrent diverticulitis, ultimately sp sigmoid resection in 2019, irregular Bms, periodic loose stools     :  Felicity Lyons MD    Staff: Circulator: Mai Graves RN  Endoscopy Technician: Margret Enrique    Referring Provider: Daniele Andrew MD    Sedation:  MAC    Procedure Details:  After informed consent was obtained with all risks and benefits of procedure explained and preoperative exam completed, the patient was taken to the endoscopy suite and placed in the left lateral decubitus position.  Upon sequential sedation as per above, a digital rectal exam was performed per below.  The Olympus videocolonoscope was inserted in the rectum and carefully advanced to the ileum.  The quality of preparation was good.  [place] Bowel Prep Score : 3/3/3.The colonoscope was slowly withdrawn with careful evaluation between folds. Retroflexion in the rectum was performed.     Findings:   Rectum: small internal hemorrhoids  Sigmoid: surgically absent  Descending Colon: mild diverticulosis, normal mucosa - biopsied   Transverse Colon: normal mucosa - biopsied   Ascending Colon: normal mucosa - biopsied   Cecum: normal mucosa   Terminal Ileum: normal mucosa - biopsied     Interventions:  bx    Specimen Removed:    ID Type Source Tests Collected by Time Destination   1 : Ileum Bx Tissue Ileum SURGICAL PATHOLOGY Felicity Lyons MD 7/14/2025 0855    2 : Random Colon Bx Tissue Colon SURGICAL PATHOLOGY Felicity Lyons MD 7/14/2025 0855        Complications: None.     EBL:  minimal     Impression:    See Postoperative diagnosis above    Recommendations:   - Await pathology. You should receive a letter within 2 weeks.   - Resume normal

## 2025-07-14 NOTE — H&P
[facility]  [address]  [place] [address]  [phone]                                History and Physical     NAME: Trinity Benavides   :  1950   MRN:  [id number]     HPI:  The patient was seen and examined.    Past Surgical History:   Procedure Laterality Date    COLONOSCOPY N/A 3/31/2023    COLONOSCOPY performed by Felicity Lyons MD at [facility] ENDOSCOPY    COLONOSCOPY N/A 2019    COLONOSCOPY performed by Felicity Lyons MD at [facility] ENDOSCOPY    COLONOSCOPY N/A 2020    COLONOSCOPY    :- performed by Felicity Lyons MD at [facility] ENDOSCOPY    COLONOSCOPY Left 2019    COLONOSCOPY (LATEX ALLERGY) performed by Felicity Lyons MD at [facility] ENDOSCOPY    GI      COLONOSCOPY     HEENT      BROKEN NOSE     HEENT      FACE SURGERY (PLASTIC SURGERY)     OTHER SURGICAL HISTORY  08/15/2019    Hand-assisted laparoscopic sigmoid colon resection, mobilization of the splenic flexure, and coloproctostomy; Dr. Dillon.    OTHER SURGICAL HISTORY      ZOILA AND BSO (CERVIX REMOVED)      Performed for treatment of a uterine fibroid and excessive bleeding.    UROLOGICAL SURGERY  08/15/2019    Cystoscopy and placement of bilateral temporary ureteral catheters; Hever Soriano MD.     Past Medical History:   Diagnosis Date    Asthma     no inhalers, controlled per pt    Cholelithiasis     Incidental finding on CT scan.    Colon cancer (HCC) 2019    Diverticulosis of colon     History of broken nose     History of colonic polyps     History of diverticulitis of colon     [place] (hard of hearing)     Hypertension     Ill-defined condition     FELL FROM A LADDER , BROKEN LEFT HAND     RBBB 2019    pt denies    Vaginal delivery     Four times.  Four episiotomies.     Social History     Tobacco Use    Smoking status: Former     Current packs/day: 0.00     Types: Cigarettes     Quit date: 1989     Years since quittin.5    Smokeless tobacco: Never

## 2025-07-14 NOTE — ANESTHESIA PRE PROCEDURE
Department of Anesthesiology  Preprocedure Note       Name:  Trinity Benavides   Age:  75 y.o.  :  1950                                          MRN:  [id number]         Date:  2025      Surgeon: Surgeon(s):  Felicity Lyons MD    Procedure: Procedure(s):  COLONOSCOPY    Medications prior to admission:   Prior to Admission medications    Medication Sig Start Date End Date Taking? Authorizing Provider   BIOTIN PO Take by mouth   Yes Clarisse Yancey MD   Cyanocobalamin (VITAMIN B 12 PO) Take by mouth   Yes ProviderClarisse MD   Cholecalciferol (VITAMIN D) 10 MCG (400 UNIT) CAPS Capsule Take 1 capsule by mouth daily   Yes ProviderClarisse MD   acetaminophen (TYLENOL) 500 MG tablet Take 1,000 mg by mouth in the morning and 1,000 mg at noon and 1,000 mg in the evening and 1,000 mg before bedtime. 19   Automatic Reconciliation, Ar   diazePAM (VALIUM) 10 MG tablet Take 10 mg by mouth every 6 hours as needed.  Patient not taking: Reported on 2025    Automatic Reconciliation, Ar   dicyclomine (BENTYL) 10 MG capsule Take 10 mg by mouth 4 times daily as needed  Patient not taking: Reported on 2025   Automatic Reconciliation, Ar       Current medications:    No current facility-administered medications for this encounter.       Allergies:    Allergies   Allergen Reactions   • Latex Dizziness or Vertigo   • Diphenhydramine Rash   • Sulfa Antibiotics Rash       Problem List:    Patient Active Problem List   Diagnosis Code   • Colon cancer (HCC) C18.9   • History of diverticulitis of colon Z87.19   • Hypertension I10   • RBBB I45.10       Past Medical History:        Diagnosis Date   • Asthma     no inhalers, controlled per pt   • Cholelithiasis     Incidental finding on CT scan.   • Colon cancer (HCC) 2019   • Diverticulosis of colon    • History of broken nose    • History of colonic polyps    • History of diverticulitis of colon    • [place] (hard of hearing)    •

## (undated) DEVICE — FILTER SMK EVAC FLO CLR MEGADYNE

## (undated) DEVICE — UNIVERSAL FIXATION CANNULA: Brand: VERSAONE

## (undated) DEVICE — ACCESS PLATFORM FOR MINIMALLY INVASIVE SURGERY.: Brand: GELPORT® LAPAROSCOPIC  SYSTEM

## (undated) DEVICE — CYSTO/BLADDER IRRIGATION SET, REGULATING CLAMP

## (undated) DEVICE — BLADELESS OPTICAL TROCAR WITH FIXATION CANNULA: Brand: VERSAONE

## (undated) DEVICE — NEEDLE HYPO 22GA L1.5IN BLK S STL HUB POLYPR SHLD REG BVL

## (undated) DEVICE — Z INACTIVE USE 2527070 DRAPE SURG W40XL44IN UNDERBUTTOCK SMS POLYPR W/ PCH BK DISP

## (undated) DEVICE — APPLIER CLP L SHFT DIA12MM 20 ROT MULT LIGACLP

## (undated) DEVICE — WRAP SURG W1.31XL1.34M CARD FOR PT 165-172CM THERMOWRP

## (undated) DEVICE — 1200 GUARD II KIT W/5MM TUBE W/O VAC TUBE: Brand: GUARDIAN

## (undated) DEVICE — FORCEPS BX L240CM JAW DIA2.8MM L CAP W/ NDL MIC MESH TOOTH

## (undated) DEVICE — Device: Brand: MEDICAL ACTION INDUSTRIES

## (undated) DEVICE — TOWEL SURG W17XL27IN STD BLU COT NONFENESTRATED PREWASHED

## (undated) DEVICE — NEEDLE HYPO 18GA L1.5IN PNK S STL HUB POLYPR SHLD REG BVL

## (undated) DEVICE — DRAPE,ROBOTICS,STERILE: Brand: MEDLINE

## (undated) DEVICE — TIDISHIELD UROLOGY DRAIN BAGS FROSTY VINYL STERILE FITS SIEMENS UROSKOP ACCESS 20 PER CASE: Brand: TIDISHIELD

## (undated) DEVICE — GOWN,SIRUS,FABRNF,XL,20/CS: Brand: MEDLINE

## (undated) DEVICE — CANN NASAL O2 CAPNOGRAPHY AD -- FILTERLINE

## (undated) DEVICE — SYR 10ML LUER LOK 1/5ML GRAD --

## (undated) DEVICE — SUTURE PDS II SZ 1 L27IN ABSRB VLT CT-1 L36MM 1/2 CIR Z341H

## (undated) DEVICE — SPONGE LAP 18X18IN STRL -- 5/PK

## (undated) DEVICE — TRAY CATH 16F URIN MTR LTX -- CONVERT TO ITEM 363111

## (undated) DEVICE — OPEN-END URETERAL CATHETER: Brand: COOK

## (undated) DEVICE — POWER SHELL: Brand: SIGNIA

## (undated) DEVICE — SPONGE GZ W4XL4IN COT 12 PLY TYP VII WVN C FLD DSGN

## (undated) DEVICE — Z DISCONTINUED NO SUB IDED SET EXTN W/ 4 W STPCOCK M SPIN LOK 36IN

## (undated) DEVICE — SNARE POLYP RND STFF 10MM -- CAPTIVATOR COLD

## (undated) DEVICE — STAPLER INT L L28MM DIA5MM GI BLU TI BARIATRIC CIR CUT 2 ROW

## (undated) DEVICE — SUTURE PERMAHAND SZ 3-0 L18IN NONABSORBABLE BLK L26MM SH C013D

## (undated) DEVICE — WRISTBAND ID AD W1XL11.5IN RED POLY ALRG PREPRINTED PERM

## (undated) DEVICE — STERILE-Z MAYO STAND COVERS CLEAR POLYETHYLENE STERILE UNIVERSAL FIT 20 PER CASE: Brand: STERILE-Z

## (undated) DEVICE — VISUALIZATION SYSTEM: Brand: CLEARIFY

## (undated) DEVICE — SUT ETHLN 3-0 18IN PS1 BLK --

## (undated) DEVICE — COLON CLOSING PACK: Brand: MEDLINE INDUSTRIES, INC.

## (undated) DEVICE — SKIN TEMPERATURE SENSOR: Brand: DEROYAL

## (undated) DEVICE — CONTAINER SPEC 20 ML LID NEUT BUFF FORMALIN 10 % POLYPR STS

## (undated) DEVICE — Device

## (undated) DEVICE — BAG BELONG PT PERS CLEAR HANDL

## (undated) DEVICE — Z DISCONTINUEDSOLUTION PREP 2OZ 10% POVIDONE IOD SCR CAP BTL

## (undated) DEVICE — SUTURE PROL SZ 2-0 L36IN NONABSORBABLE BLU SH L26MM 1/2 CIR 8523H

## (undated) DEVICE — ENDO CARRY-ON PROCEDURE KIT INCLUDES ENZYMATIC SPONGE, GAUZE, BIOHAZARD LABEL, TRAY, LUBRICANT, DIRTY SCOPE LABEL, WATER LABEL, TRAY, DRAWSTRING PAD, AND DEFENDO 4-PIECE KIT.: Brand: ENDO CARRY-ON PROCEDURE KIT

## (undated) DEVICE — INFECTION CONTROL KIT SYS

## (undated) DEVICE — SNARE ENDOSCP M L240CM W27MM SHTH DIA2.4MM CHN 2.8MM OVL

## (undated) DEVICE — STRAP,POSITIONING,KNEE/BODY,FOAM,4X60": Brand: MEDLINE

## (undated) DEVICE — POOLE SUCTION INSTRUMENT WITH REMOVABLE SHEATH: Brand: POOLE

## (undated) DEVICE — ARTICULATING RELOAD WITH TRI-STAPLE TECHNOLOGY: Brand: ENDO GIA

## (undated) DEVICE — BAG DRAIN URIN 2000ML LF STRL -- CONVERT TO ITEM 363123

## (undated) DEVICE — SURGICAL PROCEDURE PACK BASIN MAJ SET CUST NO CAUT

## (undated) DEVICE — CATH IV AUTOGRD BC BLU 22GA 25 -- INSYTE

## (undated) DEVICE — SOLUTION IRRIG 1000ML H2O STRL BLT

## (undated) DEVICE — CONNECTOR TBNG AUX H2O JET DISP FOR OLY 160/180 SER

## (undated) DEVICE — SOLUTION IRRIGATION H2O 0797305] ICU MEDICAL INC]

## (undated) DEVICE — BAG SPEC BIOHZD LF 2MIL 6X10IN -- CONVERT TO ITEM 357326

## (undated) DEVICE — TRAY PREP DRY W/ PREM GLV 2 APPL 6 SPNG 2 UNDPD 1 OVERWRAP

## (undated) DEVICE — GDWIRE UROL STR 150CM FLX TP -- BX/5 SENSOR

## (undated) DEVICE — DRAIN SURG W10MMXL20CM SIL FULL PERF HUBLESS FLAT RADPQ

## (undated) DEVICE — PACK,BASIC,SIRUS,V: Brand: MEDLINE

## (undated) DEVICE — TUBING INSUFLTN 10FT LUER -- CONVERT TO ITEM 368568

## (undated) DEVICE — BLADELESS OPTICAL TROCAR WITH FIXATION CANNULA: Brand: VERSAPORT

## (undated) DEVICE — MARKER,SKIN,WI/RULER AND LABELS: Brand: MEDLINE

## (undated) DEVICE — BW-412T DISP COMBO CLEANING BRUSH: Brand: SINGLE USE COMBINATION CLEANING BRUSH

## (undated) DEVICE — TRAP SURG QUAD PARABOLA SLOT DSGN SFTY SCRN TRAPEASE

## (undated) DEVICE — QUILTED PREMIUM COMFORT UNDERPAD,EXTRA HEAVY: Brand: WINGS

## (undated) DEVICE — RELOAD STPL 45MM CRV TIP ARTC FOR MEDIUM/THICK TISS

## (undated) DEVICE — SOLIDIFIER FLUID 3000 CC ABSORB

## (undated) DEVICE — SYRINGE MED 20ML STD CLR PLAS LUERLOCK TIP N CTRL DISP

## (undated) DEVICE — GLOVE SURG SZ 75 L1212IN FNGR THK138MIL BRN LTX FREE

## (undated) DEVICE — SUPPLEMENT DIGESTIVE H2O SOL GI-EASE

## (undated) DEVICE — SUTURE VCRL SZ 3-0 L27IN ABSRB VLT L26MM SH 1/2 CIR J316H

## (undated) DEVICE — DRAPE FLD WRM W44XL66IN C6L FOR INTRATEMP SYS THERMABASIN

## (undated) DEVICE — ROCKER SWITCH PENCIL BLADE ELECTRODE, HOLSTER: Brand: EDGE

## (undated) DEVICE — SNARE ENDOSCP M L240CM W27MM SHTH DIA2.4MM CHN 2.8MM HEX

## (undated) DEVICE — (D)PREP SKN CHLRAPRP APPL 26ML -- CONVERT TO ITEM 371833

## (undated) DEVICE — STAIN INDIA INK IN NACL 10ML --

## (undated) DEVICE — KENDALL RADIOLUCENT FOAM MONITORING ELECTRODE -RECTANGULAR SHAPE: Brand: KENDALL

## (undated) DEVICE — UNIVERSAL FIXATION CANNULA: Brand: VERSAPORT

## (undated) DEVICE — NEEDLE SCLERO 23GA L4MM CATH L240CM CNTRST SHTH DIA1.8MM

## (undated) DEVICE — AIRLIFE™ U/CONNECT-IT OXYGEN TUBING 7 FEET (2.1 M) CRUSH-RESISTANT OXYGEN TUBING, VINYL TIPPED: Brand: AIRLIFE™

## (undated) DEVICE — GARMENT,MEDLINE,DVT,INT,CALF,MED, GEN2: Brand: MEDLINE

## (undated) DEVICE — CATHETER URETH 26FR 30CC BLLN F 3 W SPEC M RND TIP TWO

## (undated) DEVICE — NEONATAL-ADULT SPO2 SENSOR: Brand: NELLCOR

## (undated) DEVICE — SUTURE PERMAHAND SZ 2-0 L30IN NONABSORBABLE BLK SILK W/O A305H

## (undated) DEVICE — PACK,CYSTOSCOPY,PK III,SIRUS: Brand: MEDLINE

## (undated) DEVICE — SPECIMEN TRAP QUAD CHMBR -- TRAPEASE

## (undated) DEVICE — STERILE POLYISOPRENE POWDER-FREE SURGICAL GLOVES WITH EMOLLIENT COATING: Brand: PROTEXIS

## (undated) DEVICE — Z DISCONTINUED USE 2751540 TUBING IRRIG L10IN DISP PMP ENDOGATOR

## (undated) DEVICE — REM POLYHESIVE ADULT PATIENT RETURN ELECTRODE: Brand: VALLEYLAB

## (undated) DEVICE — CANISTER, RIGID, 3000CC: Brand: MEDLINE INDUSTRIES, INC.

## (undated) DEVICE — SOLUTION IRRIG 3000ML 0.9% SOD CHL FLX CONT 0797208] ICU MEDICAL INC]

## (undated) DEVICE — SHEARS ENDOSCP DIA5MM SHFT L31CM BLK MIC CRV ADPT MPLR CAUT

## (undated) DEVICE — SUTURE VCRL SZ 2-0 L27IN ABSRB UD L26MM SH 1/2 CIR J417H

## (undated) DEVICE — RELOAD STPL L45MM VASCULAR/MEDIUM TISS TAN CRV TIP ARTC

## (undated) DEVICE — SYR LR LCK 1ML GRAD NSAF 30ML --

## (undated) DEVICE — SUTURE MCRYL SZ 4-0 L27IN ABSRB UD L19MM PS-2 1/2 CIR PRIM Y426H

## (undated) DEVICE — SEALER TISS L35CM DIA5MM CRV JAW ARTC ADV BPLR ENSEAL G2

## (undated) DEVICE — SOLUTION IV 1000ML 0.9% SOD CHL

## (undated) DEVICE — SET ADMIN 16ML TBNG L100IN 2 Y INJ SITE IV PIGGY BK DISP